# Patient Record
Sex: MALE | Race: WHITE | Employment: FULL TIME | URBAN - METROPOLITAN AREA
[De-identification: names, ages, dates, MRNs, and addresses within clinical notes are randomized per-mention and may not be internally consistent; named-entity substitution may affect disease eponyms.]

---

## 2018-05-02 PROBLEM — I45.6 WPW (WOLFF-PARKINSON-WHITE SYNDROME): Status: ACTIVE | Noted: 2018-05-02

## 2018-05-02 PROBLEM — R00.0 TACHYCARDIA: Status: ACTIVE | Noted: 2018-05-02

## 2018-05-04 ENCOUNTER — TELEPHONE (OUTPATIENT)
Dept: CARDIOLOGY CLINIC | Age: 23
End: 2018-05-04

## 2018-06-18 ENCOUNTER — OFFICE VISIT (OUTPATIENT)
Dept: CARDIOLOGY CLINIC | Age: 23
End: 2018-06-18

## 2018-06-18 VITALS
HEART RATE: 81 BPM | HEIGHT: 71 IN | SYSTOLIC BLOOD PRESSURE: 112 MMHG | BODY MASS INDEX: 20.72 KG/M2 | DIASTOLIC BLOOD PRESSURE: 70 MMHG | WEIGHT: 148 LBS

## 2018-06-18 DIAGNOSIS — I48.91 ATRIAL FIBRILLATION, UNSPECIFIED TYPE (HCC): Primary | ICD-10-CM

## 2018-06-18 DIAGNOSIS — I45.6 WPW (WOLFF-PARKINSON-WHITE SYNDROME): ICD-10-CM

## 2018-06-18 PROCEDURE — 93000 ELECTROCARDIOGRAM COMPLETE: CPT | Performed by: INTERNAL MEDICINE

## 2018-06-18 PROCEDURE — 99213 OFFICE O/P EST LOW 20 MIN: CPT | Performed by: INTERNAL MEDICINE

## 2019-03-14 ENCOUNTER — OFFICE VISIT (OUTPATIENT)
Dept: CARDIOLOGY CLINIC | Age: 24
End: 2019-03-14
Payer: COMMERCIAL

## 2019-03-14 ENCOUNTER — HOSPITAL ENCOUNTER (OUTPATIENT)
Dept: NON INVASIVE DIAGNOSTICS | Age: 24
Discharge: HOME | End: 2019-03-14
Payer: COMMERCIAL

## 2019-03-14 VITALS
HEIGHT: 70 IN | BODY MASS INDEX: 20.47 KG/M2 | WEIGHT: 143 LBS | HEART RATE: 77 BPM | SYSTOLIC BLOOD PRESSURE: 119 MMHG | DIASTOLIC BLOOD PRESSURE: 77 MMHG

## 2019-03-14 DIAGNOSIS — I48.91 ATRIAL FIBRILLATION, UNSPECIFIED TYPE (HCC): ICD-10-CM

## 2019-03-14 DIAGNOSIS — I45.6 WPW (WOLFF-PARKINSON-WHITE SYNDROME): Primary | ICD-10-CM

## 2019-03-14 LAB
LEFT VENTRICULAR EJECTION FRACTION HIGH VALUE: 60 %
LEFT VENTRICULAR EJECTION FRACTION MODE: NORMAL
LV EF: 60 %
LVEF MODALITY: NORMAL

## 2019-03-14 PROCEDURE — 93306 TTE W/DOPPLER COMPLETE: CPT

## 2019-03-14 PROCEDURE — 99213 OFFICE O/P EST LOW 20 MIN: CPT | Performed by: INTERNAL MEDICINE

## 2019-03-14 PROCEDURE — 93000 ELECTROCARDIOGRAM COMPLETE: CPT | Performed by: INTERNAL MEDICINE

## 2019-03-14 RX ORDER — ISOTRETINOIN 30 MG/1
60 CAPSULE ORAL DAILY
Status: ON HOLD | COMMUNITY
End: 2020-09-08 | Stop reason: ALTCHOICE

## 2019-03-18 ENCOUNTER — HOSPITAL ENCOUNTER (OUTPATIENT)
Dept: NON INVASIVE DIAGNOSTICS | Age: 24
Discharge: HOME | End: 2019-03-18
Payer: COMMERCIAL

## 2019-03-18 DIAGNOSIS — I45.6 WPW (WOLFF-PARKINSON-WHITE SYNDROME): ICD-10-CM

## 2019-03-18 DIAGNOSIS — I48.91 ATRIAL FIBRILLATION, UNSPECIFIED TYPE (HCC): ICD-10-CM

## 2019-03-18 PROCEDURE — 93017 CV STRESS TEST TRACING ONLY: CPT | Performed by: INTERNAL MEDICINE

## 2019-03-20 ENCOUNTER — OFFICE VISIT (OUTPATIENT)
Dept: CARDIOLOGY CLINIC | Age: 24
End: 2019-03-20
Payer: COMMERCIAL

## 2019-03-20 VITALS
BODY MASS INDEX: 20.81 KG/M2 | SYSTOLIC BLOOD PRESSURE: 127 MMHG | DIASTOLIC BLOOD PRESSURE: 71 MMHG | WEIGHT: 145.4 LBS | HEART RATE: 73 BPM | HEIGHT: 70 IN

## 2019-03-20 DIAGNOSIS — I48.91 ATRIAL FIBRILLATION, UNSPECIFIED TYPE (HCC): ICD-10-CM

## 2019-03-20 DIAGNOSIS — R00.2 PALPITATIONS: ICD-10-CM

## 2019-03-20 DIAGNOSIS — I45.6 WPW (WOLFF-PARKINSON-WHITE SYNDROME): ICD-10-CM

## 2019-03-20 DIAGNOSIS — R00.0 TACHYCARDIA: Primary | ICD-10-CM

## 2019-03-20 PROCEDURE — 99213 OFFICE O/P EST LOW 20 MIN: CPT | Performed by: INTERNAL MEDICINE

## 2019-03-21 ENCOUNTER — TELEPHONE (OUTPATIENT)
Dept: CARDIOLOGY CLINIC | Age: 24
End: 2019-03-21

## 2019-03-26 ENCOUNTER — OFFICE VISIT (OUTPATIENT)
Dept: CARDIOLOGY CLINIC | Age: 24
End: 2019-03-26
Payer: COMMERCIAL

## 2019-03-26 VITALS
HEART RATE: 91 BPM | SYSTOLIC BLOOD PRESSURE: 124 MMHG | DIASTOLIC BLOOD PRESSURE: 70 MMHG | WEIGHT: 148 LBS | OXYGEN SATURATION: 99 % | HEIGHT: 70 IN | BODY MASS INDEX: 21.19 KG/M2

## 2019-03-26 DIAGNOSIS — R00.0 TACHYCARDIA: Primary | ICD-10-CM

## 2019-03-26 DIAGNOSIS — I48.0 PAROXYSMAL ATRIAL FIBRILLATION (HCC): ICD-10-CM

## 2019-03-26 DIAGNOSIS — I45.6 WPW (WOLFF-PARKINSON-WHITE SYNDROME): ICD-10-CM

## 2019-03-26 DIAGNOSIS — R00.2 PALPITATIONS: ICD-10-CM

## 2019-03-26 PROCEDURE — 99213 OFFICE O/P EST LOW 20 MIN: CPT | Performed by: INTERNAL MEDICINE

## 2019-03-26 PROCEDURE — 93000 ELECTROCARDIOGRAM COMPLETE: CPT | Performed by: INTERNAL MEDICINE

## 2019-03-26 RX ORDER — FLECAINIDE ACETATE 50 MG/1
50 TABLET ORAL 2 TIMES DAILY
Qty: 60 TABLET | Refills: 3 | Status: ON HOLD | OUTPATIENT
Start: 2019-03-26 | End: 2020-09-08

## 2019-03-28 ENCOUNTER — NURSE ONLY (OUTPATIENT)
Dept: CARDIOLOGY CLINIC | Age: 24
End: 2019-03-28
Payer: COMMERCIAL

## 2019-03-28 DIAGNOSIS — R00.2 PALPITATIONS: Primary | ICD-10-CM

## 2019-03-28 PROCEDURE — 93000 ELECTROCARDIOGRAM COMPLETE: CPT | Performed by: INTERNAL MEDICINE

## 2020-09-08 ENCOUNTER — APPOINTMENT (OUTPATIENT)
Dept: GENERAL RADIOLOGY | Age: 25
DRG: 310 | End: 2020-09-08
Payer: COMMERCIAL

## 2020-09-08 ENCOUNTER — HOSPITAL ENCOUNTER (INPATIENT)
Age: 25
LOS: 1 days | Discharge: HOME | DRG: 310 | End: 2020-09-09
Attending: EMERGENCY MEDICINE | Admitting: INTERNAL MEDICINE
Payer: COMMERCIAL

## 2020-09-08 ENCOUNTER — TELEPHONE (OUTPATIENT)
Dept: CARDIOLOGY CLINIC | Age: 25
End: 2020-09-08

## 2020-09-08 PROBLEM — I45.6 WPW SYNDROME: Status: ACTIVE | Noted: 2020-09-08

## 2020-09-08 LAB
ANION GAP SERPL CALCULATED.3IONS-SCNC: 12 MMOL/L (ref 3–16)
BASOPHILS ABSOLUTE: 0.1 K/UL (ref 0–0.2)
BASOPHILS RELATIVE PERCENT: 0.5 %
BUN BLDV-MCNC: 13 MG/DL (ref 7–20)
CALCIUM SERPL-MCNC: 10.2 MG/DL (ref 8.3–10.6)
CHLORIDE BLD-SCNC: 102 MMOL/L (ref 99–110)
CO2: 25 MMOL/L (ref 21–32)
CREAT SERPL-MCNC: 0.8 MG/DL (ref 0.9–1.3)
EOSINOPHILS ABSOLUTE: 0.2 K/UL (ref 0–0.6)
EOSINOPHILS RELATIVE PERCENT: 2.2 %
GFR AFRICAN AMERICAN: >60
GFR NON-AFRICAN AMERICAN: >60
GLUCOSE BLD-MCNC: 107 MG/DL (ref 70–99)
HCT VFR BLD CALC: 47.3 % (ref 40.5–52.5)
HEMOGLOBIN: 16.9 G/DL (ref 13.5–17.5)
LYMPHOCYTES ABSOLUTE: 2.5 K/UL (ref 1–5.1)
LYMPHOCYTES RELATIVE PERCENT: 22.3 %
MCH RBC QN AUTO: 32.5 PG (ref 26–34)
MCHC RBC AUTO-ENTMCNC: 35.8 G/DL (ref 31–36)
MCV RBC AUTO: 90.6 FL (ref 80–100)
MONOCYTES ABSOLUTE: 0.6 K/UL (ref 0–1.3)
MONOCYTES RELATIVE PERCENT: 5.2 %
NEUTROPHILS ABSOLUTE: 7.9 K/UL (ref 1.7–7.7)
NEUTROPHILS RELATIVE PERCENT: 69.8 %
PDW BLD-RTO: 12.8 % (ref 12.4–15.4)
PLATELET # BLD: 227 K/UL (ref 135–450)
PMV BLD AUTO: 9.7 FL (ref 5–10.5)
POTASSIUM REFLEX MAGNESIUM: 4.5 MMOL/L (ref 3.5–5.1)
PRO-BNP: 54 PG/ML (ref 0–124)
RBC # BLD: 5.22 M/UL (ref 4.2–5.9)
SODIUM BLD-SCNC: 139 MMOL/L (ref 136–145)
TROPONIN: <0.01 NG/ML
WBC # BLD: 11.3 K/UL (ref 4–11)

## 2020-09-08 PROCEDURE — 99223 1ST HOSP IP/OBS HIGH 75: CPT | Performed by: INTERNAL MEDICINE

## 2020-09-08 PROCEDURE — G0378 HOSPITAL OBSERVATION PER HR: HCPCS

## 2020-09-08 PROCEDURE — 93005 ELECTROCARDIOGRAM TRACING: CPT | Performed by: NURSE PRACTITIONER

## 2020-09-08 PROCEDURE — 2580000003 HC RX 258: Performed by: NURSE PRACTITIONER

## 2020-09-08 PROCEDURE — 85025 COMPLETE CBC W/AUTO DIFF WBC: CPT

## 2020-09-08 PROCEDURE — 84443 ASSAY THYROID STIM HORMONE: CPT

## 2020-09-08 PROCEDURE — 71045 X-RAY EXAM CHEST 1 VIEW: CPT

## 2020-09-08 PROCEDURE — 84484 ASSAY OF TROPONIN QUANT: CPT

## 2020-09-08 PROCEDURE — 96361 HYDRATE IV INFUSION ADD-ON: CPT

## 2020-09-08 PROCEDURE — 99285 EMERGENCY DEPT VISIT HI MDM: CPT

## 2020-09-08 PROCEDURE — 93005 ELECTROCARDIOGRAM TRACING: CPT | Performed by: EMERGENCY MEDICINE

## 2020-09-08 PROCEDURE — 1200000000 HC SEMI PRIVATE

## 2020-09-08 PROCEDURE — 96374 THER/PROPH/DIAG INJ IV PUSH: CPT

## 2020-09-08 PROCEDURE — 80048 BASIC METABOLIC PNL TOTAL CA: CPT

## 2020-09-08 PROCEDURE — 83880 ASSAY OF NATRIURETIC PEPTIDE: CPT

## 2020-09-08 PROCEDURE — 2580000003 HC RX 258: Performed by: INTERNAL MEDICINE

## 2020-09-08 PROCEDURE — 36415 COLL VENOUS BLD VENIPUNCTURE: CPT

## 2020-09-08 RX ORDER — ACETAMINOPHEN 650 MG/1
650 SUPPOSITORY RECTAL EVERY 6 HOURS PRN
Status: DISCONTINUED | OUTPATIENT
Start: 2020-09-08 | End: 2020-09-09 | Stop reason: HOSPADM

## 2020-09-08 RX ORDER — MIDAZOLAM HYDROCHLORIDE 1 MG/ML
2 INJECTION INTRAMUSCULAR; INTRAVENOUS ONCE
Status: DISCONTINUED | OUTPATIENT
Start: 2020-09-08 | End: 2020-09-08

## 2020-09-08 RX ORDER — PROCAINAMIDE HYDROCHLORIDE 500 MG/ML
15 INJECTION INTRAMUSCULAR; INTRAVENOUS ONCE
Status: COMPLETED | OUTPATIENT
Start: 2020-09-08 | End: 2020-09-08

## 2020-09-08 RX ORDER — FLECAINIDE ACETATE 100 MG/1
50 TABLET ORAL 2 TIMES DAILY
Status: DISCONTINUED | OUTPATIENT
Start: 2020-09-08 | End: 2020-09-08

## 2020-09-08 RX ORDER — SODIUM CHLORIDE 0.9 % (FLUSH) 0.9 %
10 SYRINGE (ML) INJECTION PRN
Status: DISCONTINUED | OUTPATIENT
Start: 2020-09-08 | End: 2020-09-09 | Stop reason: HOSPADM

## 2020-09-08 RX ORDER — POLYETHYLENE GLYCOL 3350 17 G/17G
17 POWDER, FOR SOLUTION ORAL DAILY PRN
Status: DISCONTINUED | OUTPATIENT
Start: 2020-09-08 | End: 2020-09-09 | Stop reason: HOSPADM

## 2020-09-08 RX ORDER — FENTANYL CITRATE 50 UG/ML
100 INJECTION, SOLUTION INTRAMUSCULAR; INTRAVENOUS ONCE
Status: DISCONTINUED | OUTPATIENT
Start: 2020-09-08 | End: 2020-09-08

## 2020-09-08 RX ORDER — SODIUM CHLORIDE 0.9 % (FLUSH) 0.9 %
10 SYRINGE (ML) INJECTION EVERY 12 HOURS SCHEDULED
Status: DISCONTINUED | OUTPATIENT
Start: 2020-09-08 | End: 2020-09-09 | Stop reason: HOSPADM

## 2020-09-08 RX ORDER — 0.9 % SODIUM CHLORIDE 0.9 %
1000 INTRAVENOUS SOLUTION INTRAVENOUS ONCE
Status: COMPLETED | OUTPATIENT
Start: 2020-09-08 | End: 2020-09-08

## 2020-09-08 RX ORDER — ONDANSETRON 2 MG/ML
4 INJECTION INTRAMUSCULAR; INTRAVENOUS EVERY 6 HOURS PRN
Status: DISCONTINUED | OUTPATIENT
Start: 2020-09-08 | End: 2020-09-09 | Stop reason: HOSPADM

## 2020-09-08 RX ORDER — ACETAMINOPHEN 325 MG/1
650 TABLET ORAL EVERY 6 HOURS PRN
Status: DISCONTINUED | OUTPATIENT
Start: 2020-09-08 | End: 2020-09-09 | Stop reason: HOSPADM

## 2020-09-08 RX ORDER — PROMETHAZINE HYDROCHLORIDE 25 MG/1
12.5 TABLET ORAL EVERY 6 HOURS PRN
Status: DISCONTINUED | OUTPATIENT
Start: 2020-09-08 | End: 2020-09-09 | Stop reason: HOSPADM

## 2020-09-08 RX ORDER — SODIUM CHLORIDE 9 MG/ML
INJECTION, SOLUTION INTRAVENOUS CONTINUOUS
Status: DISCONTINUED | OUTPATIENT
Start: 2020-09-08 | End: 2020-09-09 | Stop reason: HOSPADM

## 2020-09-08 RX ADMIN — SODIUM CHLORIDE 1000 ML: 9 INJECTION, SOLUTION INTRAVENOUS at 15:14

## 2020-09-08 RX ADMIN — Medication 10 ML: at 20:28

## 2020-09-08 RX ADMIN — PROCAINAMIDE HYDROCHLORIDE 950 MG: 500 INJECTION INTRAMUSCULAR; INTRAVENOUS at 15:14

## 2020-09-08 RX ADMIN — SODIUM CHLORIDE: 9 INJECTION, SOLUTION INTRAVENOUS at 18:00

## 2020-09-08 ASSESSMENT — ENCOUNTER SYMPTOMS
VOMITING: 0
DIARRHEA: 0
CONSTIPATION: 0
BLOOD IN STOOL: 0
SORE THROAT: 0
ABDOMINAL PAIN: 0
SHORTNESS OF BREATH: 0
NAUSEA: 0
RHINORRHEA: 0

## 2020-09-08 ASSESSMENT — PAIN SCALES - GENERAL
PAINLEVEL_OUTOF10: 0

## 2020-09-08 NOTE — ED NOTES
Report given to 3A RN. Pt alert and oriented and shows no signs of distress at time of transfer to [facility]. Pt taken to room by 3A RN in wheelchair.        Korina Saravia RN  09/08/20 4688

## 2020-09-08 NOTE — H&P
Hospital Medicine History & Physical      PCP: [facility] (Inactive)    Date of Admission: 9/8/2020    Date of Service: Pt seen/examined on 9/8/2020  3:04 PM and   Admitted to Inpatient with expected LOS greater than two midnights due to medical therapy. Chief Complaint: Palpitations    History Of Present Illness:    26 y.o. male with PMHx significant for WPW syndrome status post ablation in 2018 admitted to the hospital with palpitations and lightheadedness  Half an hour prior to admission around 2 PM patient was sitting in his car suddenly had palpitations which are very regular felt very lightheaded with that  Denied any chest pain  No shortness of breath  No loss of consciousness  No abdominal pain  No fevers  No recent change in lifestyle  In the emergency department noted to be in heart rate of 200s  Having discussed with cardiology was given procainamide  When I saw him his heart rate was down 208    Past Medical History:          Diagnosis Date    Pneumonia        Past Surgical History:          Procedure Laterality Date    TYMPANOSTOMY TUBE PLACEMENT         Medications Prior to Admission:      Prior to Admission medications    Medication Sig Start Date End Date Taking? Authorizing Provider   flecainide (TAMBOCOR) 50 MG tablet Take 1 tablet by mouth 2 times daily 3/26/19   Naomi Costa MD   ISOtretinoin (ACCUTANE) 30 MG chemo capsule Take 60 mg by mouth daily    Historical Provider, MD       Allergies:  Patient has no known allergies. Social History:      The patient currently lives at home     TOBACCO:   reports that he has never smoked. He has never used smokeless tobacco.  ETOH:   reports no history of alcohol use. Family History:      Reviewed in detail and negative for DM, CAD, Cancer, CVA.  Positive as follows:        Problem Relation Age of Onset    Diabetes Father     Heart Disease Neg Hx     Hypertension Neg Hx     High Cholesterol Neg Hx        REVIEW OF SYSTEMS:   Pertinent positives as noted in the HPI. All other systems reviewed and negative. PHYSICAL EXAM:    /72   Pulse 128   Temp 98.1 °F (36.7 °C) (Oral)   Resp 17   Ht 5' 11\" (1.803 m)   Wt 140 lb (63.5 kg)   SpO2 100%   BMI 19.53 kg/m²     General appearance:  No apparent distress, appears stated age and cooperative. HEENT:  Normal cephalic, atraumatic without obvious deformity. Pupils equal, round, and reactive to light. Extra ocular muscles intact. Conjunctivae/corneas clear. Neck: Supple, with full range of motion. No jugular venous distention. Trachea midline. Respiratory:  Normal respiratory effort. Clear to auscultation, bilaterally without RALES/WHEEZES/RHONCHI. Cardiovascular:  Regular rate and rhythm with normal S1/S2 without MURMUR, rubs or gallops. Abdomen: Soft, non-tender, non-distended with normal bowel sounds. Musculoskeletal:  No clubbing, cyanosis or EDEMA bilaterally. Full range of motion without deformity. Skin: Skin color, texture, turgor normal.  No rashes or lesions. Neurologic:  Neurovascularly intact without any focal sensory/motor deficits. Cranial nerves: II-XII intact, grossly non-focal.  Psychiatric:  Alert and oriented, thought content appropriate, normal insight  Capillary Refill: Brisk,< 3 seconds   Peripheral Pulses: +2 palpable, equal bilaterally       Labs:     Recent Labs     09/08/20  1518   WBC 11.3*   HGB 16.9   HCT 47.3        Recent Labs     09/08/20  1518      K 4.5      CO2 25   BUN 13   CREATININE 0.8*   CALCIUM 10.2     No results for input(s): AST, ALT, BILIDIR, BILITOT, ALKPHOS in the last 72 hours. No results for input(s): INR in the last 72 hours.   Recent Labs     09/08/20  1518   TROPONINI <0.01       Urinalysis:    No results found for: Edouard Breanne, BACTERIA, RBCUA, BLOODU, [address], [address]    Radiology:     CXR: I have reviewed the CXR with the following interpretation: nad  EKG:  I have reviewed the EKG with

## 2020-09-08 NOTE — LETTER
[facility] Emergency Department      [address] [place] [address], [address]            PROOF OF PRESENCE      To Whom It May Concern:    Tory Baum was present in the Emergency Department at [facility] on 09/08/2020.                                      Sincerely,        TAYLOR CAI

## 2020-09-08 NOTE — ED PROVIDER NOTES
[facility]        Pt Name: Yasmani Sheppard  MRN: [id number]  [place] 1995  Date of evaluation: 9/8/2020  Provider: MAHAD Parsons CNP  PCP: [facility] (Inactive)    This patient was seen and evaluated by the attending physician Joselito Mills, [address] [place] [address]       Chief Complaint   Patient presents with    Chest Pain     c/o chest pain,sob, and palpitations, pt had episode yesterday for about 30 seconds, and restarted today        HISTORY OF PRESENT ILLNESS   (Location/Symptom, Timing/Onset,Context/Setting, Quality, Duration, Modifying Factors, Severity)  Note limiting factors. Yasmani Sheppard is a 26 y.o. male who presents emergency department with concern for racing heart beat. Symptoms started about 30 minutes prior to arrival.  He is visibly anxious on arrival.  Reports history of WPW. He thinks this is the same. He reports he is required cardioversion in the past.  Dr. Dawn Park is his cardiologist.  He denies fever, rash, headaches, dizziness, chest pain, shortness of breath, cough, congestion, abdominal pain, nausea, vomiting, diarrhea, constipation, blood in the stool, or painful urination. One friend/family member at bedside. Nursing Notes triage note reviewed and agreed with or any disagreements were addressed  in the HPI. REVIEW OF SYSTEMS    (2-9 systems for level 4, 10 or more for level 5)     Review of Systems   Constitutional: Negative for chills and fever. HENT: Negative for postnasal drip, rhinorrhea and sore throat. Eyes: Negative for visual disturbance. Respiratory: Negative for shortness of breath. Cardiovascular: Positive for palpitations. Negative for chest pain. Gastrointestinal: Negative for abdominal pain, blood in stool, constipation, diarrhea, nausea and vomiting. Genitourinary: Negative for dysuria, flank pain and hematuria. Skin: Negative for rash. Neurological: Negative for weakness and headaches. All other systems reviewed and are negative. Positives and Pertinent negatives as per HPI. Except as noted above in the ROS, all other systems were reviewed and negative. PAST MEDICAL HISTORY     Past Medical History:   Diagnosis Date    Pneumonia          SURGICAL HISTORY       Past Surgical History:   Procedure Laterality Date    TYMPANOSTOMY TUBE PLACEMENT           CURRENT MEDICATIONS       Previous Medications    FLECAINIDE (TAMBOCOR) 50 MG TABLET    Take 1 tablet by mouth 2 times daily    ISOTRETINOIN (ACCUTANE) 30 MG CHEMO CAPSULE    Take 60 mg by mouth daily         ALLERGIES     Patient has no known allergies.     FAMILY HISTORY       Family History   Problem Relation Age of Onset    Diabetes Father     Heart Disease Neg Hx     Hypertension Neg Hx     High Cholesterol Neg Hx           SOCIAL HISTORY       Social History     Socioeconomic History    Marital status: Single     Spouse name: None    Number of children: None    Years of education: None    Highest education level: None   Occupational History    None   Social Needs    Financial resource strain: None    Food insecurity     Worry: None     Inability: None    Transportation needs     Medical: None     Non-medical: None   Tobacco Use    Smoking status: Never Smoker    Smokeless tobacco: Never Used   Substance and Sexual Activity    Alcohol use: No    Drug use: No    Sexual activity: None   Lifestyle    Physical activity     Days per week: None     Minutes per session: None    Stress: None   Relationships    Social connections     Talks on phone: None     Gets together: None     Attends Jainism service: None     Active member of club or organization: None     Attends meetings of clubs or organizations: None     Relationship status: None    Intimate partner violence     Fear of current or ex partner: None     Emotionally abused: None     Physically abused: None components within normal limits    Narrative:     Performed at:  [facility]  [address]  [place], [address]   Phone [phone]   BASIC METABOLIC PANEL W/ REFLEX TO MG FOR LOW K - Abnormal; Notable for the following components:    Glucose 107 (*)     CREATININE 0.8 (*)     All other components within normal limits    Narrative:     Performed at:  [facility]  [address],  [place], [address]   Phone [phone]   TROPONIN    Narrative:     Performed at:  [facility] [address] [place],  [place], [address]   Phone [phone]     Narrative:     Performed at:  [facility] [address]  [place], [address]   Phone [phone]       All other labs werewithin normal range or not returned as of this dictation. EKG: All EKG's are interpreted by the Emergency Department Physician who either signs or Co-signs this chart in the absence of acardiologist.  Please see their note for interpretation of EKG. RADIOLOGY:   Interpretation per the Radiologist below, if available at the time of this note:    XR CHEST PORTABLE   Final Result   No acute cardiopulmonary disease. No results found. PROCEDURES   Unless otherwise noted below, none     Procedures    CRITICAL CARE TIME     There was a high probability of life-threatening clinical deterioration in the patient's condition requiring my urgent intervention. The total critical care time spent while evaluating and treating this patient was at least 32 minutes. This excludes time spent doing separately billable procedures. This includes time at the bedside, data interpretation, medication management, obtaining critical history from collateral sources if the patient is unable to provide it directly, and physician consultation.  Specifics of interventions taken and potentially life-threatening diagnostic considerations are listed in the medical decision making. CONSULTS:  IP CONSULT TO CARDIOLOGY  IP CONSULT TO HOSPITALIST      EMERGENCY DEPARTMENT COURSE and DIFFERENTIAL DIAGNOSIS/MDM:   Vitals:    Vitals:    09/08/20 1455 09/08/20 1501   BP: 119/72    Pulse: 128    Resp: 17    Temp:  98.1 °F (36.7 °C)   TempSrc:  Oral   SpO2: 100%    Weight: 140 lb (63.5 kg)    Height: 5' 11\" (1.803 m)        Shannon Cueto was given the following medications:  Medications   0.9 % sodium chloride bolus (1,000 mLs Intravenous New Bag 9/8/20 1514)   procainamide (PRONESTYL) injection 950 mg (950 mg Intravenous Given 9/8/20 1514)       Shannon Cueto was evaluated in the emergency department with concern for tachycardia. Identified to have an abnormal EKG with heart rate up to 250 bpm.  Known Yordan-Parkinson-White syndrome. For this reason he was given IV fluids and procainamide. His heart rate did improve, however he still remains tachycardic with heart rate 109-130. We spoke with Dr. Leana Harrison from cardiology. He recommends inpatient management overnight. The hospitalist was consulted for admission. This plan was discussed with the patient who is in agreement with the plan. FINAL IMPRESSION      1. Tachycardia    2. Yordan-Parkinson-White (WPW) syndrome    3.  Abnormal EKG          DISPOSITION/PLAN   DISPOSITION Decision To Admit 09/08/2020 04:18:41 PM      (Please note that portions of this note were completed with a voice recognition program.  Efforts were made to edit the dictations but occasionally words are mis-transcribed.)    MAHAD Vargas CNP (electronically signed)        MAHAD Vargas CNP  09/08/20 5445

## 2020-09-08 NOTE — ED NOTES
Bed: 02  Expected date:   Expected time:   Means of arrival:   Comments:  Rapid hr     Arsen Ferreira RN  09/08/20 9113

## 2020-09-09 VITALS
HEIGHT: 71 IN | HEART RATE: 75 BPM | DIASTOLIC BLOOD PRESSURE: 57 MMHG | SYSTOLIC BLOOD PRESSURE: 112 MMHG | OXYGEN SATURATION: 100 % | BODY MASS INDEX: 22.04 KG/M2 | RESPIRATION RATE: 18 BRPM | WEIGHT: 157.4 LBS | TEMPERATURE: 98.2 F

## 2020-09-09 LAB
A/G RATIO: 1.8 (ref 1.1–2.2)
ALBUMIN SERPL-MCNC: 4.2 G/DL (ref 3.4–5)
ALP BLD-CCNC: 69 U/L (ref 40–129)
ALT SERPL-CCNC: 47 U/L (ref 10–40)
ANION GAP SERPL CALCULATED.3IONS-SCNC: 8 MMOL/L (ref 3–16)
AST SERPL-CCNC: 26 U/L (ref 15–37)
BASOPHILS ABSOLUTE: 0 K/UL (ref 0–0.2)
BASOPHILS RELATIVE PERCENT: 0.3 %
BILIRUB SERPL-MCNC: 0.9 MG/DL (ref 0–1)
BUN BLDV-MCNC: 13 MG/DL (ref 7–20)
CALCIUM SERPL-MCNC: 9.2 MG/DL (ref 8.3–10.6)
CHLORIDE BLD-SCNC: 106 MMOL/L (ref 99–110)
CO2: 26 MMOL/L (ref 21–32)
CREAT SERPL-MCNC: 0.7 MG/DL (ref 0.9–1.3)
EKG ATRIAL RATE: 122 BPM
EKG ATRIAL RATE: 125 BPM
EKG DIAGNOSIS: NORMAL
EKG DIAGNOSIS: NORMAL
EKG P AXIS: 45 DEGREES
EKG P-R INTERVAL: 114 MS
EKG Q-T INTERVAL: 306 MS
EKG Q-T INTERVAL: 356 MS
EKG QRS DURATION: 110 MS
EKG QRS DURATION: 92 MS
EKG QTC CALCULATION (BAZETT): 455 MS
EKG QTC CALCULATION (BAZETT): 507 MS
EKG R AXIS: 53 DEGREES
EKG R AXIS: 68 DEGREES
EKG T AXIS: 49 DEGREES
EKG T AXIS: 56 DEGREES
EKG VENTRICULAR RATE: 122 BPM
EKG VENTRICULAR RATE: 133 BPM
EOSINOPHILS ABSOLUTE: 0.2 K/UL (ref 0–0.6)
EOSINOPHILS RELATIVE PERCENT: 2.4 %
GFR AFRICAN AMERICAN: >60
GFR NON-AFRICAN AMERICAN: >60
GLOBULIN: 2.3 G/DL
GLUCOSE BLD-MCNC: 101 MG/DL (ref 70–99)
HCT VFR BLD CALC: 37.8 % (ref 40.5–52.5)
HEMOGLOBIN: 13.3 G/DL (ref 13.5–17.5)
INR BLD: 1.04 (ref 0.86–1.14)
LYMPHOCYTES ABSOLUTE: 1.9 K/UL (ref 1–5.1)
LYMPHOCYTES RELATIVE PERCENT: 18.8 %
MCH RBC QN AUTO: 31.5 PG (ref 26–34)
MCHC RBC AUTO-ENTMCNC: 35.2 G/DL (ref 31–36)
MCV RBC AUTO: 89.6 FL (ref 80–100)
MONOCYTES ABSOLUTE: 0.8 K/UL (ref 0–1.3)
MONOCYTES RELATIVE PERCENT: 7.7 %
NEUTROPHILS ABSOLUTE: 7.1 K/UL (ref 1.7–7.7)
NEUTROPHILS RELATIVE PERCENT: 70.8 %
PDW BLD-RTO: 12.6 % (ref 12.4–15.4)
PLATELET # BLD: 190 K/UL (ref 135–450)
PMV BLD AUTO: 9.9 FL (ref 5–10.5)
POTASSIUM REFLEX MAGNESIUM: 4.1 MMOL/L (ref 3.5–5.1)
PROTHROMBIN TIME: 12.1 SEC (ref 10–13.2)
RBC # BLD: 4.22 M/UL (ref 4.2–5.9)
SODIUM BLD-SCNC: 140 MMOL/L (ref 136–145)
TOTAL PROTEIN: 6.5 G/DL (ref 6.4–8.2)
TSH REFLEX: 0.61 UIU/ML (ref 0.27–4.2)
WBC # BLD: 10.1 K/UL (ref 4–11)

## 2020-09-09 PROCEDURE — 93010 ELECTROCARDIOGRAM REPORT: CPT | Performed by: INTERNAL MEDICINE

## 2020-09-09 PROCEDURE — 2580000003 HC RX 258: Performed by: INTERNAL MEDICINE

## 2020-09-09 PROCEDURE — 85610 PROTHROMBIN TIME: CPT

## 2020-09-09 PROCEDURE — G0378 HOSPITAL OBSERVATION PER HR: HCPCS

## 2020-09-09 PROCEDURE — 80053 COMPREHEN METABOLIC PANEL: CPT

## 2020-09-09 PROCEDURE — 96361 HYDRATE IV INFUSION ADD-ON: CPT

## 2020-09-09 PROCEDURE — 36415 COLL VENOUS BLD VENIPUNCTURE: CPT

## 2020-09-09 PROCEDURE — 85025 COMPLETE CBC W/AUTO DIFF WBC: CPT

## 2020-09-09 RX ORDER — DILTIAZEM HYDROCHLORIDE 60 MG/1
60 TABLET, FILM COATED ORAL 4 TIMES DAILY PRN
Qty: 30 TABLET | Refills: 1 | Status: SHIPPED | OUTPATIENT
Start: 2020-09-09 | End: 2021-01-05 | Stop reason: ALTCHOICE

## 2020-09-09 RX ADMIN — SODIUM CHLORIDE: 9 INJECTION, SOLUTION INTRAVENOUS at 04:10

## 2020-09-09 RX ADMIN — Medication 10 ML: at 07:46

## 2020-09-09 ASSESSMENT — PAIN SCALES - GENERAL
PAINLEVEL_OUTOF10: 0

## 2020-09-09 NOTE — PLAN OF CARE
Problem: Falls - Risk of:  Goal: Will remain free from falls  Description: Will remain free from falls  9/9/2020 1412 by Amadou Angel RN  Outcome: Completed  9/9/2020 0209 by Jane Alexander RN  Outcome: Met This Shift  Note: Pt is wearing yellow nonskid socks. Bed is in lowest position, locked, side rails up 2/4, and call light is within reach. Pt informed of fall risks, verbalizes understanding. Will monitor. Goal: Absence of physical injury  Description: Absence of physical injury  9/9/2020 1412 by Amadou Angel RN  Outcome: Completed  9/9/2020 0209 by Jane Alexander RN  Outcome: Met This Shift     Problem: Cardiac:  Goal: Ability to maintain vital signs within normal range will improve  Description: Ability to maintain vital signs within normal range will improve  9/9/2020 1412 by Amadou Angel RN  Outcome: Completed  9/9/2020 0209 by Jane Alexander RN  Outcome: Met This Shift  Note: HR now WNL.   Goal: Cardiovascular alteration will improve  Description: Cardiovascular alteration will improve  9/9/2020 1412 by Amadou Angel RN  Outcome: Completed  9/9/2020 0209 by Jane Alexander RN  Outcome: Met This Shift     Problem: Physical Regulation:  Goal: Complications related to the disease process, condition or treatment will be avoided or minimized  Description: Complications related to the disease process, condition or treatment will be avoided or minimized  9/9/2020 1412 by Amadou Angel RN  Outcome: Completed  9/9/2020 0209 by Jane Alexander RN  Outcome: Met This Shift

## 2020-09-09 NOTE — PROGRESS NOTES
[facility]   Electrophysiology Progress Note     Date: 9/9/2020  Admit Date: 9/8/2020     Reason for consultation: WPW    Chief Complaint:   Chief Complaint   Patient presents with    Chest Pain     c/o chest pain,sob, and palpitations, pt had episode yesterday for about 30 seconds, and restarted today        History of Present Illness: History obtained from patient and medical record. Unique Santos is a 26 y.o. male with a past medical history of WPW and atrial fibrillation. He has followed with Dr. Dagoberto Ramon from our office in the past. In May of 2018, he presented to the ER with palpitations. He was found to be in a WCT and underwent urgent DCCV. He underwent EP study cryo-ablation for parahisian pathway (5/3/18, Dr. Dagoberto Ramon). His delta wave returned post op, but disappeared during stress testing at 80 BPM.     Pt presented to ER with chest pain, SOB, and palpitation. He was found to be tachycardic and was given IV procainamide with resolution to sinus rhythm. EP consulted for rhythm management. Interval Hx: Today, he is being seen for WPW. He is in sinus rhythm on telemetry. He feels better. Patient seen and examined. Clinical notes reviewed. Telemetry reviewed. No new complaints today. No major events overnight. Denies having chest pain, palpitations, shortness of breath, orthopnea/PND, cough, or dizziness at the time of this visit.     Allergies:  No Known Allergies    Home Meds:  Prior to Visit Medications    Not on File      Scheduled Meds:   sodium chloride flush  10 mL Intravenous 2 times per day    enoxaparin  40 mg Subcutaneous Nightly     Continuous Infusions:   sodium chloride 100 mL/hr at 09/09/20 0410     PRN Meds:sodium chloride flush, acetaminophen **OR** acetaminophen, polyethylene glycol, promethazine **OR** ondansetron     Past Medical History:  Past Medical History:   Diagnosis Date    Pneumonia       Past Surgical History:    has a past surgical history that includes Tympanostomy tube placement. Social History:  Reviewed. reports that he has never smoked. He has never used smokeless tobacco. He reports that he does not drink alcohol or use drugs. Family History:  Reviewed. family history includes Diabetes in his father. Review of Systems:  · Constitutional: Negative for fever, night sweats, chills, weight changes, or weakness  · Skin: Negative for rash, dry skin, pruritus, bruising, bleeding, blood clots, or changes in skin pigment  · HEENT: Negative for vision changes, ringing in the ears, sore throat, dysphagia, or swollen lymph nodes  · Respiratory: Reviewed in HPI  · Cardiovascular: Reviewed in HPI  · Gastrointestinal: Negative for abdominal pain, N/V/D, constipation, or black/tarry stools  · Genito-Urinary: Negative for dysuria, incontinence, urgency, or hematuria  · Musculoskeletal: Negative for joint swelling, muscle pain, or injuries  · Neurological/Psych: Negative for confusion, seizures, headaches, balance issues or TIA-like symptoms. No anxiety, depression, or insomnia    Physical Examination:  Vitals:    09/09/20 0746   BP: (!) 112/57   Pulse: 75   Resp: 18   Temp: 98.2 °F (36.8 °C)   SpO2: 100%      In: 490 [P.O.:480; I.V.:10]  Out: -    Wt Readings from Last 3 Encounters:   09/09/20 157 lb 6.4 oz (71.4 kg)   03/26/19 148 lb (67.1 kg)   03/20/19 145 lb 6.4 oz (66 kg)       Intake/Output Summary (Last 24 hours) at 9/9/2020 0948  Last data filed at 9/8/2020 2028  Gross per 24 hour   Intake 490 ml   Output --   Net 490 ml     Telemetry: Personally Reviewed  - Sinus rhythm   · Constitutional: Cooperative and in no apparent distress, and appears well nourished  · Skin: Warm and pink; no pallor, cyanosis, bruising, or clubbing  · HEENT: Symmetric and normocephalic. PERRL, EOM intact. Conjunctiva pink with clear sclera. Mucus membranes pink and moist. Teeth intact. Thyroid smooth without nodules or goiter. · Cardiovascular: Regular rate and rhythm. S1/S2 present without murmurs, rubs, or gallops. Peripheral pulses 2+, capillary refill < 3 seconds. No elevation of JVP. No peripheral edema  · Respiratory: Respirations symmetric and unlabored. Lungs clear to auscultation bilaterally, no wheezing, crackles, or rhonchi  · Gastrointestinal: Abdomen soft and round. Bowel sounds normoactive in all quadrants without tenderness or masses. · Musculoskeletal: Bilateral upper and lower extremity strength 5/5 with full ROM  · Neurologic/Psych: Awake and orientated to person, place and time. Calm affect, appropriate mood    Pertinent labs, diagnostic, device, and imaging results reviewed as a part of this visit    Labs:    BMP:   Recent Labs     20  1518 20  0548    140   K 4.5 4.1    106   CO2 25 26   BUN 13 13   CREATININE 0.8* 0.7*     Estimated Creatinine Clearance: 164 mL/min (A) (based on SCr of 0.7 mg/dL (L)). CBC:   Recent Labs     20  1518 20  0548   WBC 11.3* 10.1   HGB 16.9 13.3*   HCT 47.3 37.8*   MCV 90.6 89.6    190     Thyroid: No results found for: TSH, E6COVBG, Q1YQGRU, THYROIDAB  Lipids: No results found for: CHOL, HDL, TRIG  LFTS:   Lab Results   Component Value Date    ALT 47 2020    AST 26 2020    ALKPHOS 69 2020    PROT 6.5 2020    AGRATIO 1.8 2020    BILITOT 0.9 2020     Cardiac Enzymes:   Lab Results   Component Value Date    TROPONINI <0.01 2020    TROPONINI <0.01 2018     Coags:   Lab Results   Component Value Date    PROTIME 12.1 2020    INR 1.04 2020       EC20  Atrial fibrillation with RVR    ECHO: 3/19  Left ventricle - normal size, thickness and function with EF of 60%  Tricuspid valve - trivial regurgitation    Stress Test: 3/19   Normal stress test   Baseline delta wave/WPW pattern.    Delta wave disappears at HR over 80 BPM    Problem List:   Patient Active Problem List    Diagnosis Date Noted    WPW syndrome 2020    Tachycardia 05/02/2018    WPW (Yordan-Parkinson-White syndrome) 05/02/2018    Atrial fibrillation (HCC)     Palpitations         Assessment and Plan:     1. Paroxysmal Atrial Fibrillation  - Currently in NSR  - Consider Class IC medications for long term control  - [id number] score: 0 ; [id number] Vasc score and anticoagulation discussed.   ~ No need for OAC    - Afib risk factors including age, HTN, obesity, inactivity and XENIA were discussed with patient. Risk factor modification recommended   ~ TSH 0.61 (9/8/20)       - Treatment options including cardioversion, rate control strategy, antiarrhythmics, anticoagulation and possible ablation were discussed with patient. Risks, benefits and alternative of each treatment options were explained. All questions answered    2. WPW   - Hx of cryoablation of parahisian pathway (5/18)   - Delta wave noted on EKG 3/19     All pertinent information and plan of care discussed with the EP physician. All questions and concerns were addressed to the patient. Alternatives to my treatment were discussed. I have discussed the above stated plan with patient and the nurse. The patient verbalized understanding and agreed with the plan. Thank you for allowing to us to participate in the care of Vineet Ponce.     MAHAD [facility]  [address]   Office: [phone]

## 2020-09-09 NOTE — PROGRESS NOTES
Received orders for discharge to home. Reviewed discharge instructions call Dr Giuliana Rich in 2 days to schedule a cardiac ablation, start taking Cardizem as needed for sustained racing HR. Information related to A fib, ablations, arrhthymias' and new medication reviewed. Pt as voiced understanding of the above. Tele and IV removed per protocol. Pt transport independently to personal vehicle with family & all belongings.

## 2020-09-09 NOTE — PLAN OF CARE
Problem: Falls - Risk of:  Goal: Will remain free from falls  Description: Will remain free from falls  9/9/2020 0209 by Ibis San RN  Outcome: Met This Shift  Note: Pt is wearing yellow nonskid socks. Bed is in lowest position, locked, side rails up 2/4, and call light is within reach. Pt informed of fall risks, verbalizes understanding. Will monitor. 9/8/2020 1745 by Gamaliel Florse RN  Outcome: Ongoing  Goal: Absence of physical injury  Description: Absence of physical injury  9/9/2020 0209 by Ibis San RN  Outcome: Met This Shift  9/8/2020 1745 by Gamaliel Flores RN  Outcome: Ongoing     Problem: Cardiac:  Goal: Ability to maintain vital signs within normal range will improve  Description: Ability to maintain vital signs within normal range will improve  Outcome: Met This Shift  Note: HR now WNL.   Goal: Cardiovascular alteration will improve  Description: Cardiovascular alteration will improve  Outcome: Met This Shift     Problem: Physical Regulation:  Goal: Complications related to the disease process, condition or treatment will be avoided or minimized  Description: Complications related to the disease process, condition or treatment will be avoided or minimized  Outcome: Met This Shift

## 2020-09-09 NOTE — DISCHARGE SUMMARY
Physician Discharge Summary     Patient ID:  Concha Rendon  [id number]  30 y.o.  1995    Admit date: 9/8/2020    Discharge date and time: No discharge date for patient encounter. 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]    Discharge Diagnoses:   Atrial fibrillation with rapid ventricular rates converted back to sinus rhythm on his own    Admission Condition: fair    Discharged Condition: good    Indication for Admission: palpitations    Hospital Course:   26year-old known history of WPW status post ablation admitted to the hospital with palpitations and lightheadedness noted to be in fast atrial fibrillation  Per cardiology procainamide was given to the patient  His heart rate improved and he converted back to sinus rhythm  He was seen by electrophysiology who plan to do a RF ablation and Robert navigation system  In the meantime he will use diltiazem for symptom control  Patient is being discharged home        Discharge Exam:  BP (!) 112/57   Pulse 75   Temp 98.2 °F (36.8 °C) (Temporal)   Resp 18   Ht 5' 11\" (1.803 m)   Wt 157 lb 6.4 oz (71.4 kg)   SpO2 100%   BMI 21.95 kg/m²   General appearance: alert, appears stated age and cooperative  Lungs: clear to auscultation bilaterally  Heart: regular rate and rhythm, S1, S2 normal, no murmur, click, rub or gallop  Abdomen: soft, non-tender; bowel sounds normal; no masses,  no organomegaly    Disposition: home    In process/preliminary results:  Outstanding Order Results     No orders found for last 30 day(s).           Patient Instructions:   Current Discharge Medication List      CONTINUE these medications which have NOT CHANGED    Details   dilTIAZem (CARDIZEM) 60 MG tablet Take 1 tablet by mouth 4 times daily as needed (For episodes of palpitations and fast pulse rate)  Qty: 30 tablet, Refills: 1         STOP taking these medications flecainide (TAMBOCOR) 50 MG tablet Comments:   Reason for Stopping:             Activity: activity as tolerated  Diet: regular diet  Wound Care: none needed      Signed:  Liban Soliz MD  9/9/2020  2:10 PM

## 2020-09-09 NOTE — CARE COORDINATION
Discharge Planning Assessment  RN/SW discharge planner met with patient/ fiance to discuss reason for admission, current living situation, and potential needs at the time of discharge    Demographics/Insurance verified Yes    Current type of dwelling: Apartment    Patient from ECF/SW confirmed with:  N/A    Living arrangements: lives with fifanny    Level of function/Support: Independent    PCP: Dr Shell Dhaliwal      Last Visit to PCP: couple of years    DME: None    Active with any community resources/agencies/skilled home care: No    Medication compliance issues: denies    Financial issues that could impact healthcare: no        Tentative discharge plan: home    Discussed and provided facilities of choice if transition to a skilled nursing facility is required at the time of discharge- no    Discussed with patient and/or family that on the day of discharge home tentative time of discharge will be between 10 AM and noon.     Transportation at the time of discharge: family

## 2020-09-09 NOTE — CONSULTS
alcohol or use drugs. Family History:  Reviewed. family history includes Diabetes in his father. Review of System:  All other systems reviewed except for that noted above. Pertinent negatives and positives are:       · General: negative for fever, chills   · Ophthalmic ROS: negative for - eye pain or loss of vision  · ENT ROS: negative for - headaches, sore throat   · Respiratory: negative for - cough, sputum  · Cardiovascular: Reviewed in HPI  · Gastrointestinal: negative for - abdominal pain, diarrhea, N/V  · Hematology: negative for - bleeding, blood clots, bruising or jaundice  · Genito-Urinary:  negative for - Dysuria or incontinence  · Musculoskeletal: negative for - Joint swelling, muscle pain  · Neurological: negative for - confusion, + dizziness, headaches   · Psychiatric: No anxiety, no depression. · Dermatological: negative for - rash    Physical Examination:  Vitals:    20   BP: 113/71   Pulse: 87   Resp: 16   Temp: 98.8 °F (37.1 °C)   SpO2: 99%      In: 490 [P.O.:480; I.V.:10]  Out: -    Wt Readings from Last 3 Encounters:   20 141 lb 12.1 oz (64.3 kg)   19 148 lb (67.1 kg)   19 145 lb 6.4 oz (66 kg)     Temp  Av.3 °F (36.8 °C)  Min: 98 °F (36.7 °C)  Max: 98.8 °F (37.1 °C)  Pulse  Av.1  Min: 87  Max: 128  BP  Min: 108/95  Max: 135/68  SpO2  Av.9 %  Min: 99 %  Max: 100 %    Intake/Output Summary (Last 24 hours) at 2020  Last data filed at 2020  Gross per 24 hour   Intake 490 ml   Output --   Net 490 ml       · Telemetry: Sinus rhythm   · Constitutional: Oriented. No distress. · Head: Normocephalic and atraumatic. · Mouth/Throat: Oropharynx is clear and moist.   · Eyes: Conjunctivae normal. EOM are normal.   · Neck: Neck supple. No rigidity. No JVD present. · Cardiovascular: Normal rate, regular rhythm, S1&S2. · Pulmonary/Chest: Bilateral respiratory sounds. No wheezes, No rhonchi. · Abdominal: Soft. Bowel sounds present. No distension, No tenderness. · Musculoskeletal: No tenderness. No edema    · Lymphadenopathy: Has no cervical adenopathy. · Neurological: Alert and oriented. Cranial nerve appears intact, No Gross deficit   · Skin: Skin is warm and dry. No rash noted. · Psychiatric: Has a normal behavior     Labs, diagnostic and imaging results reviewed. Reviewed. Recent Labs     09/08/20  1518      K 4.5      CO2 25   BUN 13   CREATININE 0.8*     Recent Labs     09/08/20  1518   WBC 11.3*   HGB 16.9   HCT 47.3   MCV 90.6        Lab Results   Component Value Date    TROPONINI <0.01 09/08/2020     No results found for: BNP  Lab Results   Component Value Date    PROTIME 11.7 05/02/2018    INR 1.04 05/02/2018     No results found for: CHOL, HDL, TRIG    ECG: Atrial fibrillation with RVR     Sinus tachycardia, incomplete RBBB     Echo: 2016:   Left ventricle - normal size, thickness and function with EF of 60%     Tricuspid valve - trivial regurgitation      Scheduled Meds:   sodium chloride flush  10 mL Intravenous 2 times per day    enoxaparin  40 mg Subcutaneous Nightly     Continuous Infusions:   sodium chloride 100 mL/hr at 09/08/20 1800     PRN Meds:.sodium chloride flush, acetaminophen **OR** acetaminophen, polyethylene glycol, promethazine **OR** ondansetron     Patient Active Problem List    Diagnosis Date Noted    Tachycardia 05/02/2018     Priority: Low    WPW (Yordan-Parkinson-White syndrome) 05/02/2018     Priority: Low    Atrial fibrillation (HCC)      Priority: Low    Palpitations      Priority: Low    WPW syndrome 09/08/2020      Active Hospital Problems    Diagnosis Date Noted    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 We discussed treatment options including antiarrhythmics, rate control with anticoagulation, and ablation.      We discussed progressive nature of atrial fibrillation. Treatment success decreases when AF becomes persistent and last more than 6 months. Antiarrhythmic therapy, side effects, benefits and alternative discussed. Atrial fibrillation ablation procedure was discussed. We discussed the need for repeat procedure. On average patients may need more than one ablation procedure. Risks associated with ablation include but not limited to allergic reaction to the medications, pain, bleeding, infection, nerve injury, injury to diaphragm(breathing muscle), pulmonary embolus(blood clot in lungs), deep vein blood clot, pneumothorax, hemothorax, acute renal failure, cardiac perforation,  tamponade, need for emergent surgery (open heart), permanent pacemaker, pulmonary vein stenosis, left atrial to esophageal fistula, stroke, myocardial infarction and death. Difference between atrial fibrillation and atrial flutter discussed and treatment discussed. Patient verbalized understanding of procedure, risks and benefits and wishes to proceed with ablation. Will schedule for RF ablation and Carto navigation system. Educational information about ablation was provided and patient was encouraged to review information and call back with any questions. Discussed also antiarrhythmic therapy and medical therapy with patient. He will use as needed Cardizem if he has tachycardia. - WPW syndrome:    Patient has history of WPW syndrome and ablation. Although it appears that his accessory pathway conduction had recurred after ablation but conduction was intermittent and with the rate more than 80 bpm preexcitation was no longer present on his GXT. Also on arrival his ECG was narrow complex with no preexcitation. I do not see any preexcitation on his rest ECG too.   Since he opted for EP study and ablation will check for preexcitation and accessory pathway conduction during A. fib ablation.    - The patient is counseled to avoid excess caffeine, and energy drinks as this may exacerbated ectopy and arrhythmia. - The patient is counseled to get regular exercise 3-5 times per week to control cardiovascular risk factors. Thank you for allowing me to participate in the care of Devendra Anderson     I independently reviewed his ECG, chest x-ray and prior outside records. All questions and concerns were addressed to the patient/family. Alternatives to my treatment were discussed. I have discussed the above stated plan and the patient verbalized understanding and agreed with the plan. NOTE: This report was transcribed using voice recognition software. Every effort was made to ensure accuracy, however, inadvertent computerized transcription errors may be present.      Hank Hughes MD, MPH  [place] [address]   Office: [phone]

## 2020-09-09 NOTE — PROGRESS NOTES
Assessment and med pass complete, pt refused SQ Lovenox, this RN explained risks associated with refusal, pt continued to refuse. Provided with fresh ice water and apple juice. Denies needs, call light in reach.

## 2020-09-10 ENCOUNTER — TELEPHONE (OUTPATIENT)
Dept: CARDIOLOGY CLINIC | Age: 25
End: 2020-09-10

## 2020-09-10 NOTE — TELEPHONE ENCOUNTER
Pt was released from [facility] yesterday and today his HR is going up & down ranging from . He was driving to his parent's home and became lightheaded. They attempted to take his BP but his arm went numb. Please call to advise. Thank  You.

## 2020-09-10 NOTE — TELEPHONE ENCOUNTER
Dr. Nichole Guevara saw patient yesterday while inpatient at [facility] and will be scheduling him for ablation. Please have Dr. Nichole Guevara review message and give recommendations.

## 2020-09-10 NOTE — TELEPHONE ENCOUNTER
Spoke to patient and he would rather come into office - I placed him on schedule for 12pm  To see me for placement

## 2020-09-11 ENCOUNTER — TELEPHONE (OUTPATIENT)
Dept: CARDIOLOGY CLINIC | Age: 25
End: 2020-09-11

## 2020-09-11 ENCOUNTER — NURSE ONLY (OUTPATIENT)
Dept: CARDIOLOGY CLINIC | Age: 25
End: 2020-09-11
Payer: COMMERCIAL

## 2020-09-11 PROCEDURE — 0296T PR EXT ECG > 48HR TO 21 DAY RCRD W/CONECT INTL RCRD: CPT | Performed by: INTERNAL MEDICINE

## 2020-09-11 NOTE — TELEPHONE ENCOUNTER
He got a heart monitor today and was told not to work out or get sweaty for at least one day . His job is very physical and sweaty, should he stay home today ? If he should stay home he needs a note excusing him .  Please call to advise

## 2020-09-14 NOTE — TELEPHONE ENCOUNTER
Spoke to the pt-stated he is bringing in [employer] paper work. Asking to be scheduled for the ablation.

## 2020-09-14 NOTE — TELEPHONE ENCOUNTER
Please see if he still needs the note for a day off. If so, I will complete and sign for him.  Thanks

## 2020-09-18 ENCOUNTER — TELEPHONE (OUTPATIENT)
Dept: CARDIOLOGY CLINIC | Age: 25
End: 2020-09-18

## 2020-09-18 NOTE — TELEPHONE ENCOUNTER
Type of paperwork is being dropped off: (FMLA/ DISABILITY/PATIENT ASSISTANCE)  FMLA      Who is the patient's provider: Dr. Phoebe Cardenas saw patient in hospital        Where is the paperwork to be sent: Fax: [phone].     josep

## 2020-09-25 PROCEDURE — 0298T PR EXT ECG > 48HR TO 21 DAY REVIEW AND INTERPRETATN: CPT | Performed by: INTERNAL MEDICINE

## 2020-11-09 ENCOUNTER — OFFICE VISIT (OUTPATIENT)
Dept: PRIMARY CARE CLINIC | Age: 25
End: 2020-11-09
Payer: COMMERCIAL

## 2020-11-09 PROCEDURE — 99211 OFF/OP EST MAY X REQ PHY/QHP: CPT | Performed by: NURSE PRACTITIONER

## 2020-11-09 NOTE — PATIENT INSTRUCTIONS

## 2020-11-09 NOTE — PROGRESS NOTES
Haley Bahena received a viral test for COVID-19. They were educated on isolation and quarantine as appropriate. For any symptoms, they were directed to seek care from their PCP, given contact information to establish with a doctor, directed to an urgent care or the emergency room.

## 2020-11-10 LAB — SARS-COV-2: NOT DETECTED

## 2020-11-13 ENCOUNTER — HOSPITAL ENCOUNTER (OUTPATIENT)
Dept: CARDIAC CATH/INVASIVE PROCEDURES | Age: 25
Discharge: HOME | End: 2020-11-13
Attending: INTERNAL MEDICINE | Admitting: INTERNAL MEDICINE
Payer: COMMERCIAL

## 2020-11-13 ENCOUNTER — ANESTHESIA EVENT (OUTPATIENT)
Dept: CARDIAC CATH/INVASIVE PROCEDURES | Age: 25
End: 2020-11-13
Payer: COMMERCIAL

## 2020-11-13 ENCOUNTER — ANESTHESIA (OUTPATIENT)
Dept: CARDIAC CATH/INVASIVE PROCEDURES | Age: 25
End: 2020-11-13
Payer: COMMERCIAL

## 2020-11-13 VITALS
WEIGHT: 145 LBS | SYSTOLIC BLOOD PRESSURE: 120 MMHG | RESPIRATION RATE: 28 BRPM | OXYGEN SATURATION: 99 % | TEMPERATURE: 98.1 F | HEART RATE: 93 BPM | DIASTOLIC BLOOD PRESSURE: 53 MMHG | HEIGHT: 71 IN | BODY MASS INDEX: 20.3 KG/M2

## 2020-11-13 VITALS
SYSTOLIC BLOOD PRESSURE: 138 MMHG | RESPIRATION RATE: 9 BRPM | OXYGEN SATURATION: 99 % | DIASTOLIC BLOOD PRESSURE: 65 MMHG | TEMPERATURE: 99.3 F

## 2020-11-13 LAB
A/G RATIO: 1.7 (ref 1.1–2.2)
ABO/RH: NORMAL
ALBUMIN SERPL-MCNC: 5.1 G/DL (ref 3.4–5)
ALP BLD-CCNC: 81 U/L (ref 40–129)
ALT SERPL-CCNC: 57 U/L (ref 10–40)
ANION GAP SERPL CALCULATED.3IONS-SCNC: 12 MMOL/L (ref 3–16)
ANTIBODY SCREEN: NORMAL
AST SERPL-CCNC: 33 U/L (ref 15–37)
BILIRUB SERPL-MCNC: 1.7 MG/DL (ref 0–1)
BUN BLDV-MCNC: 14 MG/DL (ref 7–20)
CALCIUM SERPL-MCNC: 10.1 MG/DL (ref 8.3–10.6)
CHLORIDE BLD-SCNC: 101 MMOL/L (ref 99–110)
CO2: 26 MMOL/L (ref 21–32)
CREAT SERPL-MCNC: 0.7 MG/DL (ref 0.9–1.3)
GFR AFRICAN AMERICAN: >60
GFR NON-AFRICAN AMERICAN: >60
GLOBULIN: 3 G/DL
GLUCOSE BLD-MCNC: 107 MG/DL (ref 70–99)
HCT VFR BLD CALC: 42.9 % (ref 40.5–52.5)
HEMOGLOBIN: 15.2 G/DL (ref 13.5–17.5)
INR BLD: 1.05 (ref 0.86–1.14)
LV EF: 58 %
LVEF MODALITY: NORMAL
MAGNESIUM: 2.2 MG/DL (ref 1.8–2.4)
MCH RBC QN AUTO: 31.2 PG (ref 26–34)
MCHC RBC AUTO-ENTMCNC: 35.5 G/DL (ref 31–36)
MCV RBC AUTO: 88.1 FL (ref 80–100)
PDW BLD-RTO: 12.8 % (ref 12.4–15.4)
PLATELET # BLD: 189 K/UL (ref 135–450)
PMV BLD AUTO: 9.5 FL (ref 5–10.5)
POC ACT LR: 174 SEC
POTASSIUM SERPL-SCNC: 4.1 MMOL/L (ref 3.5–5.1)
PROTHROMBIN TIME: 12.2 SEC (ref 10–13.2)
RBC # BLD: 4.86 M/UL (ref 4.2–5.9)
SODIUM BLD-SCNC: 139 MMOL/L (ref 136–145)
TOTAL PROTEIN: 8.1 G/DL (ref 6.4–8.2)
WBC # BLD: 8 K/UL (ref 4–11)

## 2020-11-13 PROCEDURE — 93312 ECHO TRANSESOPHAGEAL: CPT

## 2020-11-13 PROCEDURE — 2500000003 HC RX 250 WO HCPCS: Performed by: NURSE ANESTHETIST, CERTIFIED REGISTERED

## 2020-11-13 PROCEDURE — 86850 RBC ANTIBODY SCREEN: CPT

## 2020-11-13 PROCEDURE — 6360000002 HC RX W HCPCS: Performed by: NURSE ANESTHETIST, CERTIFIED REGISTERED

## 2020-11-13 PROCEDURE — 93325 DOPPLER ECHO COLOR FLOW MAPG: CPT

## 2020-11-13 PROCEDURE — 6360000002 HC RX W HCPCS

## 2020-11-13 PROCEDURE — 86900 BLOOD TYPING SEROLOGIC ABO: CPT

## 2020-11-13 PROCEDURE — 93653 COMPRE EP EVAL TX SVT: CPT

## 2020-11-13 PROCEDURE — 3700000001 HC ADD 15 MINUTES (ANESTHESIA)

## 2020-11-13 PROCEDURE — 93623 PRGRMD STIMJ&PACG IV RX NFS: CPT | Performed by: INTERNAL MEDICINE

## 2020-11-13 PROCEDURE — 3700000000 HC ANESTHESIA ATTENDED CARE

## 2020-11-13 PROCEDURE — 2500000003 HC RX 250 WO HCPCS

## 2020-11-13 PROCEDURE — C1732 CATH, EP, DIAG/ABL, 3D/VECT: HCPCS

## 2020-11-13 PROCEDURE — 80053 COMPREHEN METABOLIC PANEL: CPT

## 2020-11-13 PROCEDURE — 7100000001 HC PACU RECOVERY - ADDTL 15 MIN

## 2020-11-13 PROCEDURE — 93005 ELECTROCARDIOGRAM TRACING: CPT

## 2020-11-13 PROCEDURE — 85347 COAGULATION TIME ACTIVATED: CPT

## 2020-11-13 PROCEDURE — 93662 INTRACARDIAC ECG (ICE): CPT | Performed by: INTERNAL MEDICINE

## 2020-11-13 PROCEDURE — C1730 CATH, EP, 19 OR FEW ELECT: HCPCS

## 2020-11-13 PROCEDURE — 93621 COMP EP EVL L PAC&REC C SINS: CPT | Performed by: INTERNAL MEDICINE

## 2020-11-13 PROCEDURE — C1769 GUIDE WIRE: HCPCS

## 2020-11-13 PROCEDURE — 2580000003 HC RX 258

## 2020-11-13 PROCEDURE — C1894 INTRO/SHEATH, NON-LASER: HCPCS

## 2020-11-13 PROCEDURE — 93662 INTRACARDIAC ECG (ICE): CPT

## 2020-11-13 PROCEDURE — 93320 DOPPLER ECHO COMPLETE: CPT

## 2020-11-13 PROCEDURE — 93010 ELECTROCARDIOGRAM REPORT: CPT | Performed by: INTERNAL MEDICINE

## 2020-11-13 PROCEDURE — 36415 COLL VENOUS BLD VENIPUNCTURE: CPT

## 2020-11-13 PROCEDURE — 2580000003 HC RX 258: Performed by: NURSE ANESTHETIST, CERTIFIED REGISTERED

## 2020-11-13 PROCEDURE — 93621 COMP EP EVL L PAC&REC C SINS: CPT

## 2020-11-13 PROCEDURE — 93613 INTRACARDIAC EPHYS 3D MAPG: CPT

## 2020-11-13 PROCEDURE — C1759 CATH, INTRA ECHOCARDIOGRAPHY: HCPCS

## 2020-11-13 PROCEDURE — 99283 EMERGENCY DEPT VISIT LOW MDM: CPT

## 2020-11-13 PROCEDURE — 93623 PRGRMD STIMJ&PACG IV RX NFS: CPT

## 2020-11-13 PROCEDURE — 85610 PROTHROMBIN TIME: CPT

## 2020-11-13 PROCEDURE — 83735 ASSAY OF MAGNESIUM: CPT

## 2020-11-13 PROCEDURE — 7100000000 HC PACU RECOVERY - FIRST 15 MIN

## 2020-11-13 PROCEDURE — 93653 COMPRE EP EVAL TX SVT: CPT | Performed by: INTERNAL MEDICINE

## 2020-11-13 PROCEDURE — 85027 COMPLETE CBC AUTOMATED: CPT

## 2020-11-13 PROCEDURE — 93005 ELECTROCARDIOGRAM TRACING: CPT | Performed by: INTERNAL MEDICINE

## 2020-11-13 PROCEDURE — 86901 BLOOD TYPING SEROLOGIC RH(D): CPT

## 2020-11-13 PROCEDURE — 93613 INTRACARDIAC EPHYS 3D MAPG: CPT | Performed by: INTERNAL MEDICINE

## 2020-11-13 RX ORDER — FENTANYL CITRATE 50 UG/ML
50 INJECTION, SOLUTION INTRAMUSCULAR; INTRAVENOUS EVERY 5 MIN PRN
Status: DISCONTINUED | OUTPATIENT
Start: 2020-11-13 | End: 2020-11-13 | Stop reason: HOSPADM

## 2020-11-13 RX ORDER — HYDROMORPHONE HCL 110MG/55ML
0.25 PATIENT CONTROLLED ANALGESIA SYRINGE INTRAVENOUS EVERY 5 MIN PRN
Status: DISCONTINUED | OUTPATIENT
Start: 2020-11-13 | End: 2020-11-13 | Stop reason: HOSPADM

## 2020-11-13 RX ORDER — HYDROMORPHONE HCL 110MG/55ML
0.5 PATIENT CONTROLLED ANALGESIA SYRINGE INTRAVENOUS EVERY 5 MIN PRN
Status: DISCONTINUED | OUTPATIENT
Start: 2020-11-13 | End: 2020-11-13 | Stop reason: HOSPADM

## 2020-11-13 RX ORDER — SODIUM CHLORIDE 9 MG/ML
INJECTION, SOLUTION INTRAVENOUS CONTINUOUS PRN
Status: DISCONTINUED | OUTPATIENT
Start: 2020-11-13 | End: 2020-11-13 | Stop reason: SDUPTHER

## 2020-11-13 RX ORDER — METOPROLOL TARTRATE 5 MG/5ML
INJECTION INTRAVENOUS PRN
Status: DISCONTINUED | OUTPATIENT
Start: 2020-11-13 | End: 2020-11-13 | Stop reason: SDUPTHER

## 2020-11-13 RX ORDER — SODIUM CHLORIDE 9 MG/ML
INJECTION, SOLUTION INTRAVENOUS CONTINUOUS
Status: CANCELLED | OUTPATIENT
Start: 2020-11-13

## 2020-11-13 RX ORDER — LIDOCAINE HYDROCHLORIDE 10 MG/ML
1 INJECTION, SOLUTION EPIDURAL; INFILTRATION; INTRACAUDAL; PERINEURAL
Status: CANCELLED | OUTPATIENT
Start: 2020-11-13 | End: 2020-11-13

## 2020-11-13 RX ORDER — FENTANYL CITRATE 50 UG/ML
INJECTION, SOLUTION INTRAMUSCULAR; INTRAVENOUS PRN
Status: DISCONTINUED | OUTPATIENT
Start: 2020-11-13 | End: 2020-11-13 | Stop reason: SDUPTHER

## 2020-11-13 RX ORDER — HYDROCODONE BITARTRATE AND ACETAMINOPHEN 5; 325 MG/1; MG/1
2 TABLET ORAL PRN
Status: DISCONTINUED | OUTPATIENT
Start: 2020-11-13 | End: 2020-11-13 | Stop reason: HOSPADM

## 2020-11-13 RX ORDER — SODIUM CHLORIDE 0.9 % (FLUSH) 0.9 %
10 SYRINGE (ML) INJECTION PRN
Status: CANCELLED | OUTPATIENT
Start: 2020-11-13

## 2020-11-13 RX ORDER — SUCCINYLCHOLINE CHLORIDE 20 MG/ML
INJECTION INTRAMUSCULAR; INTRAVENOUS PRN
Status: DISCONTINUED | OUTPATIENT
Start: 2020-11-13 | End: 2020-11-13 | Stop reason: SDUPTHER

## 2020-11-13 RX ORDER — ONDANSETRON 2 MG/ML
INJECTION INTRAMUSCULAR; INTRAVENOUS PRN
Status: DISCONTINUED | OUTPATIENT
Start: 2020-11-13 | End: 2020-11-13 | Stop reason: SDUPTHER

## 2020-11-13 RX ORDER — PROPOFOL 10 MG/ML
INJECTION, EMULSION INTRAVENOUS PRN
Status: DISCONTINUED | OUTPATIENT
Start: 2020-11-13 | End: 2020-11-13 | Stop reason: SDUPTHER

## 2020-11-13 RX ORDER — HYDROCODONE BITARTRATE AND ACETAMINOPHEN 5; 325 MG/1; MG/1
1 TABLET ORAL PRN
Status: DISCONTINUED | OUTPATIENT
Start: 2020-11-13 | End: 2020-11-13 | Stop reason: HOSPADM

## 2020-11-13 RX ORDER — FENTANYL CITRATE 50 UG/ML
25 INJECTION, SOLUTION INTRAMUSCULAR; INTRAVENOUS EVERY 5 MIN PRN
Status: DISCONTINUED | OUTPATIENT
Start: 2020-11-13 | End: 2020-11-13 | Stop reason: HOSPADM

## 2020-11-13 RX ORDER — DIPHENHYDRAMINE HYDROCHLORIDE 50 MG/ML
12.5 INJECTION INTRAMUSCULAR; INTRAVENOUS
Status: DISCONTINUED | OUTPATIENT
Start: 2020-11-13 | End: 2020-11-13 | Stop reason: HOSPADM

## 2020-11-13 RX ORDER — DEXAMETHASONE SODIUM PHOSPHATE 4 MG/ML
INJECTION, SOLUTION INTRA-ARTICULAR; INTRALESIONAL; INTRAMUSCULAR; INTRAVENOUS; SOFT TISSUE PRN
Status: DISCONTINUED | OUTPATIENT
Start: 2020-11-13 | End: 2020-11-13 | Stop reason: SDUPTHER

## 2020-11-13 RX ORDER — GLYCOPYRROLATE 1 MG/5 ML
SYRINGE (ML) INTRAVENOUS PRN
Status: DISCONTINUED | OUTPATIENT
Start: 2020-11-13 | End: 2020-11-13 | Stop reason: SDUPTHER

## 2020-11-13 RX ORDER — MIDAZOLAM HYDROCHLORIDE 1 MG/ML
INJECTION INTRAMUSCULAR; INTRAVENOUS PRN
Status: DISCONTINUED | OUTPATIENT
Start: 2020-11-13 | End: 2020-11-13 | Stop reason: SDUPTHER

## 2020-11-13 RX ORDER — PROMETHAZINE HYDROCHLORIDE 25 MG/ML
6.25 INJECTION, SOLUTION INTRAMUSCULAR; INTRAVENOUS EVERY 30 MIN PRN
Status: DISCONTINUED | OUTPATIENT
Start: 2020-11-13 | End: 2020-11-13 | Stop reason: HOSPADM

## 2020-11-13 RX ORDER — SODIUM CHLORIDE 0.9 % (FLUSH) 0.9 %
10 SYRINGE (ML) INJECTION EVERY 12 HOURS SCHEDULED
Status: CANCELLED | OUTPATIENT
Start: 2020-11-13

## 2020-11-13 RX ORDER — LIDOCAINE HYDROCHLORIDE 20 MG/ML
INJECTION, SOLUTION EPIDURAL; INFILTRATION; INTRACAUDAL; PERINEURAL PRN
Status: DISCONTINUED | OUTPATIENT
Start: 2020-11-13 | End: 2020-11-13 | Stop reason: SDUPTHER

## 2020-11-13 RX ORDER — MEPERIDINE HYDROCHLORIDE 25 MG/ML
12.5 INJECTION INTRAMUSCULAR; INTRAVENOUS; SUBCUTANEOUS EVERY 5 MIN PRN
Status: DISCONTINUED | OUTPATIENT
Start: 2020-11-13 | End: 2020-11-13 | Stop reason: HOSPADM

## 2020-11-13 RX ORDER — HEPARIN SODIUM 1000 [USP'U]/ML
INJECTION, SOLUTION INTRAVENOUS; SUBCUTANEOUS PRN
Status: DISCONTINUED | OUTPATIENT
Start: 2020-11-13 | End: 2020-11-13 | Stop reason: SDUPTHER

## 2020-11-13 RX ADMIN — PHENYLEPHRINE HYDROCHLORIDE 100 MCG/MIN: 10 INJECTION INTRAVENOUS at 09:20

## 2020-11-13 RX ADMIN — MIDAZOLAM 2 MG: 1 INJECTION INTRAMUSCULAR; INTRAVENOUS at 07:50

## 2020-11-13 RX ADMIN — PHENYLEPHRINE HYDROCHLORIDE 200 MCG: 10 INJECTION INTRAVENOUS at 08:47

## 2020-11-13 RX ADMIN — MIDAZOLAM 2 MG: 1 INJECTION INTRAMUSCULAR; INTRAVENOUS at 07:30

## 2020-11-13 RX ADMIN — SODIUM CHLORIDE: 9 INJECTION, SOLUTION INTRAVENOUS at 07:38

## 2020-11-13 RX ADMIN — PROPOFOL 300 MG: 10 INJECTION, EMULSION INTRAVENOUS at 07:50

## 2020-11-13 RX ADMIN — SUCCINYLCHOLINE CHLORIDE 160 MG: 20 INJECTION, SOLUTION INTRAMUSCULAR; INTRAVENOUS at 07:50

## 2020-11-13 RX ADMIN — METOPROLOL TARTRATE 2.5 MG: 1 INJECTION, SOLUTION INTRAVENOUS at 10:04

## 2020-11-13 RX ADMIN — FENTANYL CITRATE 50 MCG: 50 INJECTION INTRAMUSCULAR; INTRAVENOUS at 07:50

## 2020-11-13 RX ADMIN — ISOPROTERENOL HYDROCHLORIDE 2 MCG/MIN: 0.2 INJECTION, SOLUTION INTRAMUSCULAR; INTRAVENOUS at 09:41

## 2020-11-13 RX ADMIN — SODIUM CHLORIDE: 9 INJECTION, SOLUTION INTRAVENOUS at 08:30

## 2020-11-13 RX ADMIN — LIDOCAINE HYDROCHLORIDE 100 MG: 20 INJECTION, SOLUTION EPIDURAL; INFILTRATION; INTRACAUDAL; PERINEURAL at 07:50

## 2020-11-13 RX ADMIN — ONDANSETRON 4 MG: 2 INJECTION INTRAMUSCULAR; INTRAVENOUS at 07:54

## 2020-11-13 RX ADMIN — PHENYLEPHRINE HYDROCHLORIDE 200 MCG: 10 INJECTION INTRAVENOUS at 09:13

## 2020-11-13 RX ADMIN — DEXAMETHASONE SODIUM PHOSPHATE 4 MG: 4 INJECTION, SOLUTION INTRAMUSCULAR; INTRAVENOUS at 07:54

## 2020-11-13 RX ADMIN — HEPARIN SODIUM 3000 UNITS: 1000 INJECTION INTRAVENOUS; SUBCUTANEOUS at 08:19

## 2020-11-13 RX ADMIN — Medication 0.2 MG: at 08:47

## 2020-11-13 RX ADMIN — FENTANYL CITRATE 50 MCG: 50 INJECTION INTRAMUSCULAR; INTRAVENOUS at 11:30

## 2020-11-13 RX ADMIN — PHENYLEPHRINE HYDROCHLORIDE 200 MCG: 10 INJECTION INTRAVENOUS at 09:19

## 2020-11-13 RX ADMIN — PHENYLEPHRINE HYDROCHLORIDE 100 MCG: 10 INJECTION INTRAVENOUS at 09:07

## 2020-11-13 RX ADMIN — PROPOFOL 50 MG: 10 INJECTION, EMULSION INTRAVENOUS at 09:46

## 2020-11-13 RX ADMIN — PHENYLEPHRINE HYDROCHLORIDE 100 MCG: 10 INJECTION INTRAVENOUS at 09:04

## 2020-11-13 ASSESSMENT — PULMONARY FUNCTION TESTS
PIF_VALUE: 17
PIF_VALUE: 14
PIF_VALUE: 19
PIF_VALUE: 17
PIF_VALUE: 16
PIF_VALUE: 17
PIF_VALUE: 19
PIF_VALUE: 3
PIF_VALUE: 15
PIF_VALUE: 16
PIF_VALUE: 17
PIF_VALUE: 18
PIF_VALUE: 16
PIF_VALUE: 18
PIF_VALUE: 0
PIF_VALUE: 19
PIF_VALUE: 16
PIF_VALUE: 16
PIF_VALUE: 17
PIF_VALUE: 19
PIF_VALUE: 17
PIF_VALUE: 17
PIF_VALUE: 19
PIF_VALUE: 17
PIF_VALUE: 16
PIF_VALUE: 18
PIF_VALUE: 16
PIF_VALUE: 17
PIF_VALUE: 17
PIF_VALUE: 16
PIF_VALUE: 17
PIF_VALUE: 1
PIF_VALUE: 15
PIF_VALUE: 16
PIF_VALUE: 15
PIF_VALUE: 1
PIF_VALUE: 16
PIF_VALUE: 16
PIF_VALUE: 17
PIF_VALUE: 16
PIF_VALUE: 12
PIF_VALUE: 15
PIF_VALUE: 17
PIF_VALUE: 19
PIF_VALUE: 16
PIF_VALUE: 17
PIF_VALUE: 16
PIF_VALUE: 13
PIF_VALUE: 1
PIF_VALUE: 17
PIF_VALUE: 1
PIF_VALUE: 17
PIF_VALUE: 16
PIF_VALUE: 17
PIF_VALUE: 15
PIF_VALUE: 15
PIF_VALUE: 18
PIF_VALUE: 17
PIF_VALUE: 17
PIF_VALUE: 18
PIF_VALUE: 16
PIF_VALUE: 19
PIF_VALUE: 16
PIF_VALUE: 17
PIF_VALUE: 15
PIF_VALUE: 1
PIF_VALUE: 17
PIF_VALUE: 17
PIF_VALUE: 1
PIF_VALUE: 18
PIF_VALUE: 17
PIF_VALUE: 16
PIF_VALUE: 16
PIF_VALUE: 17
PIF_VALUE: 3
PIF_VALUE: 16
PIF_VALUE: 17
PIF_VALUE: 18
PIF_VALUE: 17
PIF_VALUE: 1
PIF_VALUE: 2
PIF_VALUE: 17
PIF_VALUE: 17
PIF_VALUE: 16
PIF_VALUE: 1
PIF_VALUE: 18
PIF_VALUE: 16
PIF_VALUE: 17
PIF_VALUE: 19
PIF_VALUE: 16
PIF_VALUE: 17
PIF_VALUE: 17
PIF_VALUE: 16
PIF_VALUE: 17
PIF_VALUE: 16
PIF_VALUE: 17
PIF_VALUE: 16
PIF_VALUE: 16
PIF_VALUE: 15
PIF_VALUE: 17
PIF_VALUE: 13
PIF_VALUE: 17
PIF_VALUE: 18
PIF_VALUE: 17
PIF_VALUE: 16
PIF_VALUE: 17
PIF_VALUE: 17
PIF_VALUE: 16
PIF_VALUE: 17
PIF_VALUE: 16
PIF_VALUE: 17
PIF_VALUE: 2
PIF_VALUE: 19
PIF_VALUE: 15
PIF_VALUE: 0
PIF_VALUE: 17
PIF_VALUE: 13
PIF_VALUE: 17
PIF_VALUE: 16
PIF_VALUE: 16
PIF_VALUE: 17
PIF_VALUE: 17
PIF_VALUE: 16
PIF_VALUE: 17
PIF_VALUE: 1
PIF_VALUE: 18
PIF_VALUE: 17
PIF_VALUE: 19
PIF_VALUE: 19
PIF_VALUE: 16
PIF_VALUE: 16
PIF_VALUE: 17
PIF_VALUE: 17
PIF_VALUE: 16
PIF_VALUE: 16
PIF_VALUE: 17
PIF_VALUE: 18
PIF_VALUE: 1
PIF_VALUE: 18
PIF_VALUE: 18
PIF_VALUE: 1
PIF_VALUE: 16
PIF_VALUE: 18
PIF_VALUE: 16
PIF_VALUE: 16
PIF_VALUE: 15
PIF_VALUE: 15
PIF_VALUE: 16
PIF_VALUE: 17
PIF_VALUE: 16
PIF_VALUE: 16
PIF_VALUE: 17
PIF_VALUE: 20
PIF_VALUE: 17
PIF_VALUE: 17
PIF_VALUE: 15
PIF_VALUE: 2
PIF_VALUE: 16
PIF_VALUE: 17
PIF_VALUE: 17
PIF_VALUE: 15
PIF_VALUE: 17
PIF_VALUE: 17
PIF_VALUE: 16
PIF_VALUE: 17
PIF_VALUE: 17
PIF_VALUE: 16
PIF_VALUE: 13
PIF_VALUE: 16
PIF_VALUE: 1
PIF_VALUE: 17
PIF_VALUE: 15
PIF_VALUE: 17
PIF_VALUE: 17
PIF_VALUE: 15
PIF_VALUE: 0
PIF_VALUE: 21
PIF_VALUE: 17
PIF_VALUE: 17
PIF_VALUE: 12
PIF_VALUE: 17
PIF_VALUE: 18
PIF_VALUE: 17
PIF_VALUE: 17
PIF_VALUE: 1
PIF_VALUE: 16
PIF_VALUE: 16
PIF_VALUE: 5
PIF_VALUE: 15
PIF_VALUE: 16
PIF_VALUE: 16
PIF_VALUE: 15
PIF_VALUE: 15
PIF_VALUE: 16
PIF_VALUE: 1
PIF_VALUE: 17
PIF_VALUE: 16
PIF_VALUE: 17
PIF_VALUE: 1
PIF_VALUE: 15
PIF_VALUE: 18
PIF_VALUE: 16
PIF_VALUE: 17
PIF_VALUE: 16
PIF_VALUE: 17
PIF_VALUE: 17
PIF_VALUE: 15
PIF_VALUE: 17
PIF_VALUE: 19
PIF_VALUE: 1
PIF_VALUE: 18
PIF_VALUE: 17
PIF_VALUE: 2
PIF_VALUE: 16
PIF_VALUE: 17

## 2020-11-13 NOTE — H&P
[address]   Electrophysiology Consultation   Date: 11/13/2020  Reason for Consultation: Palpitation, Tachycardia   Consult Requesting Physician: Bakari Carbajal MD     CC: Palpitation   HPI: Susanna Mueller is a 26 y.o. male history of WPW s/p ablation para-hisian pathway using cryoablation on 05/03/2018. Prior to ablation he had recurrent palpitation, and SVT. He also had atrial fibrillation with RVR with pre-excitation and conduction over AP requiring cardioversion prior to his ablation. On 3/26/2019 in follow up visit, he was found to have recurrence of accessory pathway conduction with preexcitation on his ECG. He had GXT which showed disappearance of preexcitation with exercise with heart rates > 80 bpm.      Later developed atrial fibrillation and treated with Flecainide. Patient states that he was sitting at home and suddenly felt palpitation. Palpitation continued and he developed a lightheadedness and dizziness and chest pressure. He came to the emergency room found to be in atrial fibrillation with rapid ventricular rate. On arrival to ER he was in atrial fibrillation with rapid ventricular rates with no preexcitation on his ECG. He received Procainamide IV infusion in ER. Sudden onset of palpitation, dizziness and lightheadedness. Patient states that he stopped flecainide after 1 month of taking it. He did not have any episodes till now. He denies drinking alcohol however has energy drinks twice a week. Past Medical History:   Diagnosis Date    Pneumonia         Past Surgical History:   Procedure Laterality Date    TYMPANOSTOMY TUBE PLACEMENT         No Known Allergies    Social History:  Reviewed. reports that he has never smoked. He has never used smokeless tobacco. He reports that he does not drink alcohol or use drugs. Family History:  Reviewed. family history includes Diabetes in his father.      Review of System:  All other systems reviewed except for that noted above. Pertinent negatives and positives are:       · General: negative for fever, chills   · Ophthalmic ROS: negative for - eye pain or loss of vision  · ENT ROS: negative for - headaches, sore throat   · Respiratory: negative for - cough, sputum  · Cardiovascular: Reviewed in HPI  · Gastrointestinal: negative for - abdominal pain, diarrhea, N/V  · Hematology: negative for - bleeding, blood clots, bruising or jaundice  · Genito-Urinary:  negative for - Dysuria or incontinence  · Musculoskeletal: negative for - Joint swelling, muscle pain  · Neurological: negative for - confusion, + dizziness, headaches   · Psychiatric: No anxiety, no depression. · Dermatological: negative for - rash    Physical Examination:  Vitals:    20 0649   BP: 138/87   Pulse: 106   Temp: 98 °F (36.7 °C)   SpO2: 98%      No intake/output data recorded. Wt Readings from Last 3 Encounters:   20 145 lb (65.8 kg)   20 157 lb 6.4 oz (71.4 kg)   19 148 lb (67.1 kg)     Temp  Av °F (36.7 °C)  Min: 98 °F (36.7 °C)  Max: 98 °F (36.7 °C)  Pulse  Av  Min: 106  Max: 106  BP  Min: 138/87  Max: 138/87  SpO2  Av %  Min: 98 %  Max: 98 %  No intake or output data in the 24 hours ending 20 0733    · Telemetry: Sinus rhythm   · Constitutional: Oriented. No distress. · Head: Normocephalic and atraumatic. · Mouth/Throat: Oropharynx is clear and moist.   · Eyes: Conjunctivae normal. EOM are normal.   · Neck: Neck supple. No rigidity. No JVD present. · Cardiovascular: Normal rate, regular rhythm, S1&S2. · Pulmonary/Chest: Bilateral respiratory sounds. No wheezes, No rhonchi. · Abdominal: Soft. Bowel sounds present. No distension, No tenderness. · Musculoskeletal: No tenderness. No edema    · Lymphadenopathy: Has no cervical adenopathy. · Neurological: Alert and oriented. Cranial nerve appears intact, No Gross deficit   · Skin: Skin is warm and dry. No rash noted.    · Psychiatric: Has a normal behavior     Labs, diagnostic and imaging results reviewed. Reviewed. Recent Labs     11/13/20  0650      K 4.1      CO2 26   BUN 14   CREATININE 0.7*     Recent Labs     11/13/20  0650   WBC 8.0   HGB 15.2   HCT 42.9   MCV 88.1        Lab Results   Component Value Date    TROPONINI <0.01 09/08/2020     No results found for: BNP  Lab Results   Component Value Date    PROTIME 12.2 11/13/2020    PROTIME 12.1 09/09/2020    PROTIME 11.7 05/02/2018    INR 1.05 11/13/2020    INR 1.04 09/09/2020    INR 1.04 05/02/2018     No results found for: CHOL, HDL, TRIG    ECG: Atrial fibrillation with RVR     Sinus tachycardia, incomplete RBBB     Echo: 2016:   Left ventricle - normal size, thickness and function with EF of 60%     Tricuspid valve - trivial regurgitation      Scheduled Meds:    Continuous Infusions:    PRN Meds:. HYDROmorphone, HYDROmorphone, fentanNYL, fentanNYL, HYDROcodone 5 mg - acetaminophen **OR** HYDROcodone 5 mg - acetaminophen, diphenhydrAMINE, promethazine, meperidine     Patient Active Problem List    Diagnosis Date Noted    Tachycardia 05/02/2018     Priority: Low    WPW (Yordan-Parkinson-White syndrome) 05/02/2018     Priority: Low    Atrial fibrillation (HCC)      Priority: Low    Palpitation      Priority: Low    WPW syndrome 09/08/2020      There are no active hospital problems to display for this patient. Assessment:   - Paroxysmal atrial fibrillation with RVR    Severe exacerbation with tachycardia, LH, dizziness and palpitation. We discussed treatment options including antiarrhythmics, rate control with anticoagulation, and ablation. We discussed progressive nature of atrial fibrillation. Treatment success decreases when AF becomes persistent and last more than 6 months. Antiarrhythmic therapy, side effects, benefits and alternative discussed. Atrial fibrillation ablation procedure was discussed. We discussed the need for repeat procedure.  On average patients may need more than one ablation procedure. Risks associated with ablation include but not limited to allergic reaction to the medications, pain, bleeding, infection, nerve injury, injury to diaphragm(breathing muscle), pulmonary embolus(blood clot in lungs), deep vein blood clot, pneumothorax, hemothorax, acute renal failure, cardiac perforation,  tamponade, need for emergent surgery (open heart), permanent pacemaker, pulmonary vein stenosis, left atrial to esophageal fistula, stroke, myocardial infarction and death. Difference between atrial fibrillation and atrial flutter discussed and treatment discussed. Patient verbalized understanding of procedure, risks and benefits and wishes to proceed with ablation. Will schedule for RF ablation and Carto navigation system. Educational information about ablation was provided and patient was encouraged to review information and call back with any questions. Discussed also antiarrhythmic therapy and medical therapy with patient. He will use as needed Cardizem if he has tachycardia. - WPW syndrome:    Patient has history of WPW syndrome and ablation. Although it appears that his accessory pathway conduction had recurred after ablation but conduction was intermittent and with the rate more than 80 bpm preexcitation was no longer present on his GXT. Also on arrival his ECG was narrow complex with no preexcitation. I do not see any preexcitation on his rest ECG too. Since he opted for EP study and ablation will check for preexcitation and accessory pathway conduction during A. fib ablation.    - The patient is counseled to avoid excess caffeine, and energy drinks as this may exacerbated ectopy and arrhythmia. - The patient is counseled to get regular exercise 3-5 times per week to control cardiovascular risk factors.      Thank you for allowing me to participate in the care of Norris Jesus     I independently reviewed his ECG,

## 2020-11-13 NOTE — PROGRESS NOTES
Patient transferred from Cath lab to PACU, VSS. Right femoral dressing CDI, surrounding area soft. Right pedal pulses present. Denies pain. Continue to monitor.

## 2020-11-13 NOTE — ANESTHESIA PRE PROCEDURE
Department of Anesthesiology  Preprocedure Note       Name:  Licha Dewitt   Age:  26 y. o.  :  1995                                          MRN:  [id number]         Date:  2020      Surgeon: * Surgery not found *    Procedure:     Medications prior to admission:   Prior to Admission medications    Medication Sig Start Date End Date Taking? Authorizing Provider   dilTIAZem (CARDIZEM) 60 MG tablet Take 1 tablet by mouth 4 times daily as needed (For episodes of palpitations and fast pulse rate) 20   MAHAD Mitchell CNP       Current medications:    No current facility-administered medications for this encounter. Allergies:  No Known Allergies    Problem List:    Patient Active Problem List   Diagnosis Code    Tachycardia R00.0    WPW (Yordan-Parkinson-White syndrome) I45.6    Atrial fibrillation (HCC) I48.91    Palpitation R00.2    WPW syndrome I45.6       Past Medical History:        Diagnosis Date    Pneumonia        Past Surgical History:        Procedure Laterality Date    TYMPANOSTOMY TUBE PLACEMENT         Social History:    Social History     Tobacco Use    Smoking status: Never Smoker    Smokeless tobacco: Never Used   Substance Use Topics    Alcohol use: No                                Counseling given: Not Answered      Vital Signs (Current): There were no vitals filed for this visit.                                            BP Readings from Last 3 Encounters:   20 (!) 112/57   19 124/70   19 127/71       NPO Status:                                                                                 BMI:   Wt Readings from Last 3 Encounters:   20 157 lb 6.4 oz (71.4 kg)   19 148 lb (67.1 kg)   19 145 lb 6.4 oz (66 kg)     There is no height or weight on file to calculate BMI.    CBC:   Lab Results   Component Value Date    WBC 10.1 2020    RBC 4.22 2020    HGB 13.3 2020    HCT 37.8 2020    MCV 89.6 2020 RDW 12.6 09/09/2020     09/09/2020       CMP:   Lab Results   Component Value Date     09/09/2020    K 4.1 09/09/2020     09/09/2020    CO2 26 09/09/2020    BUN 13 09/09/2020    CREATININE 0.7 09/09/2020    GFRAA >60 09/09/2020    AGRATIO 1.8 09/09/2020    LABGLOM >60 09/09/2020    GLUCOSE 101 09/09/2020    PROT 6.5 09/09/2020    CALCIUM 9.2 09/09/2020    BILITOT 0.9 09/09/2020    ALKPHOS 69 09/09/2020    AST 26 09/09/2020    ALT 47 09/09/2020       POC Tests: No results for input(s): POCGLU, POCNA, POCK, POCCL, POCBUN, POCHEMO, POCHCT in the last 72 hours. Coags:   Lab Results   Component Value Date    PROTIME 12.1 09/09/2020    INR 1.04 09/09/2020    APTT 29.0 05/02/2018       HCG (If Applicable): No results found for: PREGTESTUR, PREGSERUM, HCG, HCGQUANT     ABGs: No results found for: PHART, PO2ART, QZC0EWJ, CXG5MTY, BEART, O2PBUCHW     Type & Screen (If Applicable):  No results found for: LABABO, LABRH    Drug/Infectious Status (If Applicable):  No results found for: HIV, HEPCAB    COVID-19 Screening (If Applicable):   Lab Results   Component Value Date    COVID19 Not Detected 11/09/2020         Anesthesia Evaluation  Patient summary reviewed and Nursing notes reviewed  Airway: Mallampati: II  TM distance: >3 FB   Neck ROM: full  Mouth opening: > = 3 FB Dental:          Pulmonary:                              Cardiovascular:  Exercise tolerance: good (>4 METS),   (+) dysrhythmias: atrial fibrillation,                ROS comment: WPW     Neuro/Psych:               GI/Hepatic/Renal:             Endo/Other:                     Abdominal:           Vascular:                                        Anesthesia Plan      general     ASA 2     (UPPER AND LOWER RETAINER)  Induction: intravenous and rapid sequence. MIPS: Postoperative opioids intended, Prophylactic antiemetics administered and Postoperative trial extubation. Anesthetic plan and risks discussed with patient.       Plan discussed with CRNA.                 Erum Dickson MD   11/13/2020

## 2020-11-13 NOTE — PROGRESS NOTES
Patient awake, VSS. Right femoral dressing CDI and surrounding area soft. Right pedal and posterior tibial pulses present. All phase 1 discharge criteria met. Will transfer to cath lab for discharge.

## 2020-11-13 NOTE — PROCEDURES
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  Three-dimensional electroanatomic mapping of the right atrium    Left atrial recording and mapping via coronary sinus    Intracardiac ultrasound     Indications for procedure: SVT, Palpitation   Norris Lee 26 y.o. male  history of WPW s/p ablation para-hisian pathway using cryoablation on 05/03/2018 by Dr. Santino Shaffer. Prior to ablation he had recurrent palpitation, and SVT. He also had atrial fibrillation with RVR with pre-excitation and conduction over AP requiring cardioversion prior to his ablation. After ablation, on 3/26/2019 in follow up visit, he was found to have recurrence of accessory pathway conduction with preexcitation on his ECG. He had GXT which showed disappearance of preexcitation with exercise with heart rates > 80 bpm. Later developed atrial fibrillation and treated with Flecainide. However, had recurrent SVT and palpitation with was very symptomatic with lightheadedness, dizziness and chest pressure and led to hospitalization. On arrival to ER he was in atrial fibrillation with rapid ventricular rates with no preexcitation on his ECG. He received Procainamide IV infusion in ER and admitted. He was seen and notice preexcitation and treatment options discussed and he opted to proceed with EP study and ablation. He is here today for further evaluation of SVT and atrial fibrillation and possible ablation of atrial fibrillation. Details of Procedure: The risks, benefits and alternatives of the ablation procedure were discussed with the patient.  The risks including, but not limited to, the risks of bleeding, infection, radiation exposure, injury to vascular, cardiac and surrounding structures (including pneumothorax), stroke, cardiac perforation, tamponade, need for emergent open heart surgery, need for pacemaker implantation, myocardial infarction and death were discussed in detail. The patient opted to proceed with the ablation. Written informed consent was signed and placed in the chart. The patient was brought to the electrophysiology lab in a fasting nonsedated state. He underwent general anesthesia. Ultrasound was used for femoral venous access. The femoral vein was identified with real time visualization of needle passage to the venous lumen. We gained access to right femoral vein. Three 8F, sheath used and were placed in the right femoral vein using modified Seldinger technique and ultrasound guidance. A decapolar catheter was advanced into the coronary sinus under fluoroscopy so the distal poles were in the coronary sinus for left atrial recording and mapping. Intracardiac ultrasound was used and 3D electro anatomical mapping using Valene Sensing system and intracardiac ultrasound was created. Patient developed recurrent episodes of SVT with movement of coronary sinus catheters in the right atrium. Tachycardia cycle length was 350 ms. Tachycardia was a narrow complex tachycardia and spontaneously terminated with an A suggesting this is not an atrial tachycardia. This occurred at the beginning of the procedure before we have His and RV catheter in place. Patient had recurrent episodes of this tachycardia. A quadripolar catheter was placed on his position. We also advanced our 4 mm ablation catheter which was placed in the RV for further evaluation of this narrow complex tachycardia.       . OK interval 80 msec with manifest preexcitation with positive delta wave on II, III, aVF, isoelectric-negative in V1, positive in I.    . QRS duration 138 msec (pre-excited suggestive of septal right sided pathway  . A-H interval of 50 and H-V interval of 20 ms with manifest pre-exciation. Hesham Herrera 1:1 conduction of over accessory pathway was 410 msec   . 1:1 conduction over AV node was 310 ms  . Accessory pathway ERP was 600/410 ms  . AVN ERP was 400/310/310 ms    . 1:1 retrograde conduction over accessory pathway was 360 ms   . Retrograde conduction was centric with earliest atrial activity on His cathter      Patient had intermittent right bundle branch block aberrancy with pacing. Patient was started on isuprel 2 mcg. A sustained supraventricular narrow complex  tachyarrhythmia was induced. Tachycardia cycle length was 350 ms with concentric retrograde activation and earliest atrial activity on the His bundle catheter suggestive of retrograde conduction over septal parahisian accessory pathway. His-refractory PVC terminated tachycardia without conduction to atrium confirming participation of the pathway in tachycardia mechanism. 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 This was despite lack of robust antegrade conduction over pathway. Patient had brief episodes of atrial fibrillation which was nonsustained. It appears that his SVT triggers atrial fibrillation. Decision was made to proceed with ablation of accessory pathway. Although he has had atrial fibrillation in the past, but it was thought that it was triggered by his SVT. Ablation & Mapping   A 4 mm irrigated tip ablation catheter was used for mapping of accessory pathway. Mapping and ablation was very challenging due to location of accessory pathway and intermittent full preexcitation. For mapping we use intermittent boluses of adenosine to have maximal preexcitation. During maximal preexcitation we mapped the earliest ventricular activity. This location was close to the His bundle location and there was a small his potential on distal ablation catheter.   A location slightly distal to His bundle location, earliest ventricular activation was about 20 ms earlier than delta wave on surface ECG. There was also QS on unipolar cathehter. Then while pacing from atrium at 500 ms cycle length and monitoring AH and HV interval during atrial pacing, A lesion was placed on the earliest ventricular activity using 27 W. This resulted in disappearance of delta wave and preexcitation. Multiple doses of adenosine were given through central line. This initially showed no preexcitation and disappearance of accessory pathway conduction. However after 35 minutes waiting and using adenosine, noticed recurrence of delta wave and pre-exciation. Again mapping of the earliest ventricular activity during maximal preexcitation induced by intermittent boluses of adenosine was performed. The new location was a slightly superior to the prior ablation lesion. At this location the ventricular activation was again about 20 ms earlier than delta wave on the surface ECG. There was also a QS on unipolar electrogram. While pacing from atrium at 500 ms and monitoring AH and HV interval and AV conduction during atrial pacing, a lesion was placed on this earliest ventricular activity. We again used multiple doses of adenosine to block the AV node and confirmed lack of conduction over accessory pathway with disappearance of delta wave and elimination of preexcitation. Post ablation aggressive programmed stimulation on and off Isuprel with three premature atrial stimuli up to 200 ms were done and did not induce any sustained arrhythmia. No SVT was induced. AH interval was 60 msec with HV interval of 40 msec post ablation. Procedure was challenging and prolonged due to multiple reason including the location of accessory pathway which was close to his location and required very careful mapping and ablation to avoid injury to the AV node.   To map we needed to use intermittent multiple dose of adenosine to block the AV node and have maximal preexcitation. Also initial ablation was done and after waiting period the pathway conduction recurred. This resulted in prolonged and challenging procedure. At the end of the procedure, using ICE we confirmed the lack of any pericardial effusion. EBL less than 20 ml. Conclusion:   Successful ablation of WPW syndrome and orthodromic AVRT via parahisian pathway. PLAN:   The patient will be observed and will be discharged if remains stable. Patient will receive usual post ablation care.

## 2020-11-13 NOTE — ANESTHESIA POSTPROCEDURE EVALUATION
Department of Anesthesiology  Postprocedure Note    Patient: uSsanna Mueller  MRN: [id number]  YOB: 1995  Date of evaluation: 11/13/2020  Time:  11:49 AM     Procedure Summary     Date:  11/13/20 Room / Location:  [facility] Cath Lab; [facility] Echocardiography    Anesthesia Start:  2829 Anesthesia Stop:      Procedure:  ECHO SANTY IN CARDIAC CATH Diagnosis:       Paroxysmal atrial fibrillation      Pre-excitation syndrome      Palpitations    Scheduled Providers:   Responsible Provider:  Erica Brothers MD    Anesthesia Type:  general ASA Status:  2          Anesthesia Type: general    William Phase I:      William Phase II:      Last vitals: Reviewed and per EMR flowsheets.        Anesthesia Post Evaluation    Patient location during evaluation: PACU  Patient participation: complete - patient participated  Level of consciousness: awake and alert  Pain score: 2  Airway patency: patent  Nausea & Vomiting: no vomiting  Complications: no  Cardiovascular status: blood pressure returned to baseline  Respiratory status: acceptable  Hydration status: euvolemic

## 2020-11-14 ENCOUNTER — HOSPITAL ENCOUNTER (EMERGENCY)
Age: 25
Discharge: HOME | End: 2020-11-14
Attending: FAMILY MEDICINE
Payer: COMMERCIAL

## 2020-11-14 VITALS
DIASTOLIC BLOOD PRESSURE: 76 MMHG | RESPIRATION RATE: 16 BRPM | OXYGEN SATURATION: 99 % | TEMPERATURE: 98.4 F | BODY MASS INDEX: 20.22 KG/M2 | HEART RATE: 87 BPM | SYSTOLIC BLOOD PRESSURE: 133 MMHG | WEIGHT: 145 LBS

## 2020-11-14 LAB
BASOPHILS ABSOLUTE: 0 K/UL (ref 0–0.2)
BASOPHILS RELATIVE PERCENT: 0.4 %
EOSINOPHILS ABSOLUTE: 0.1 K/UL (ref 0–0.6)
EOSINOPHILS RELATIVE PERCENT: 0.6 %
HCT VFR BLD CALC: 37.2 % (ref 40.5–52.5)
HEMOGLOBIN: 13.1 G/DL (ref 13.5–17.5)
LYMPHOCYTES ABSOLUTE: 1.8 K/UL (ref 1–5.1)
LYMPHOCYTES RELATIVE PERCENT: 15.9 %
MCH RBC QN AUTO: 31.6 PG (ref 26–34)
MCHC RBC AUTO-ENTMCNC: 35.1 G/DL (ref 31–36)
MCV RBC AUTO: 90 FL (ref 80–100)
MONOCYTES ABSOLUTE: 1.2 K/UL (ref 0–1.3)
MONOCYTES RELATIVE PERCENT: 10.7 %
NEUTROPHILS ABSOLUTE: 8 K/UL (ref 1.7–7.7)
NEUTROPHILS RELATIVE PERCENT: 72.4 %
PDW BLD-RTO: 12.9 % (ref 12.4–15.4)
PLATELET # BLD: 188 K/UL (ref 135–450)
PMV BLD AUTO: 9.6 FL (ref 5–10.5)
RBC # BLD: 4.14 M/UL (ref 4.2–5.9)
WBC # BLD: 11.1 K/UL (ref 4–11)

## 2020-11-14 PROCEDURE — 36415 COLL VENOUS BLD VENIPUNCTURE: CPT

## 2020-11-14 PROCEDURE — 85025 COMPLETE CBC W/AUTO DIFF WBC: CPT

## 2020-11-16 NOTE — ED PROVIDER NOTES
[address]  [address] [id number]  Dept: [phone]  Loc: [phone]    EMERGENCY DEPARTMENT ENCOUNTER      CHIEF COMPLAINT    Chief Complaint   Patient presents with    Post-op Problem     Pt had a cardiac ablation today through the right groin area. SItting at home tinight when he felt warmth on his leg and realized the site was bleeding heavily. Bleeding controlled in triage. HPI    Susanna Mueller is a 26 y.o. male who presents with episode of bleeding from his right calf site. Patient had ablation earlier today. While playing videogames he noticed what he describes as a significant amount of blood pulling on his couch. Direct pressure control this. He presents to the emergency department with no active bleeding. He denies chest pain or palpitations. He denies shortness of breath. He denies presyncope or fatigue. He denies petechiae purpura melena or other bleeding diathesis. He is not on blood thinner. He denies actually any groin pain. He denies any radicular symptoms weakness of his lower extremities or decreased sensation in his right leg. He denies any inguinal swelling.     REVIEW OF SYSTEMS    Skin: No lacerations or puncture wounds  Musculoskeletal: see HPI, no other joint or bony injury or pain  Neurologic: No loss of consciousness, no head injury, no paresthesias or focal distal extremity weakness  I    PAST MEDICAL & SURGICAL HISTORY    Past Medical History:   Diagnosis Date    Pneumonia      Past Surgical History:   Procedure Laterality Date    TYMPANOSTOMY TUBE PLACEMENT         CURRENT MEDICATIONS  (may include discharge medications prescribed in the ED)  Current Outpatient Rx   Medication Sig Dispense Refill    dilTIAZem (CARDIZEM) 60 MG tablet Take 1 tablet by mouth 4 times daily as needed (For episodes of palpitations and fast pulse rate) 30 tablet 1       ALLERGIES    No Known Allergies    SOCIAL & FAMILY HISTORY    Social History     Socioeconomic History    Marital status: Single     Spouse name: None    Number of children: None    Years of education: None    Highest education level: None   Occupational History    None   Social Needs    Financial resource strain: None    Food insecurity     Worry: None     Inability: None    Transportation needs     Medical: None     Non-medical: None   Tobacco Use    Smoking status: Never Smoker    Smokeless tobacco: Never Used   Substance and Sexual Activity    Alcohol use: No    Drug use: No    Sexual activity: None   Lifestyle    Physical activity     Days per week: None     Minutes per session: None    Stress: None   Relationships    Social connections     Talks on phone: None     Gets together: None     Attends Orthodox service: None     Active member of club or organization: None     Attends meetings of clubs or organizations: None     Relationship status: None    Intimate partner violence     Fear of current or ex partner: None     Emotionally abused: None     Physically abused: None     Forced sexual activity: None   Other Topics Concern    None   Social History Narrative    None     Family History   Problem Relation Age of Onset    Diabetes Father     Heart Disease Neg Hx     Hypertension Neg Hx     High Cholesterol Neg Hx          PHYSICAL EXAM    VITAL SIGNS: /76   Pulse 87   Temp 98.4 °F (36.9 °C) (Infrared)   Resp 16   Wt 145 lb (65.8 kg)   SpO2 99%   BMI 20.22 kg/m²   Constitutional:  Well nourished, no acute distress  HENT:  Atraumatic, moist mucous membranes  NECK: normal range of motion,  supple   Respiratory:  No respiratory distress  Cardiovascular:  No JVD  Vascular: bilateral DP pulse 2+, bilateral femoral pulses 2+ and symmetrical.  There is no palpable bruit or audible thrill.   There is minimal bruising around the catheter site  Musculoskeletal: Gait normal.    Integument:  Well hydrated, no

## 2020-11-17 LAB
EKG ATRIAL RATE: 106 BPM
EKG DIAGNOSIS: NORMAL
EKG P AXIS: 67 DEGREES
EKG P-R INTERVAL: 100 MS
EKG Q-T INTERVAL: 334 MS
EKG QRS DURATION: 126 MS
EKG QTC CALCULATION (BAZETT): 443 MS
EKG R AXIS: 64 DEGREES
EKG T AXIS: 256 DEGREES
EKG VENTRICULAR RATE: 106 BPM

## 2020-12-24 ENCOUNTER — APPOINTMENT (OUTPATIENT)
Dept: GENERAL RADIOLOGY | Age: 25
End: 2020-12-24
Payer: COMMERCIAL

## 2020-12-24 ENCOUNTER — HOSPITAL ENCOUNTER (EMERGENCY)
Age: 25
Discharge: HOME | End: 2020-12-25
Attending: EMERGENCY MEDICINE
Payer: COMMERCIAL

## 2020-12-24 PROCEDURE — 12001 RPR S/N/AX/GEN/TRNK 2.5CM/<: CPT

## 2020-12-24 PROCEDURE — 99283 EMERGENCY DEPT VISIT LOW MDM: CPT

## 2020-12-24 PROCEDURE — 73130 X-RAY EXAM OF HAND: CPT

## 2020-12-24 RX ORDER — OXYCODONE HYDROCHLORIDE AND ACETAMINOPHEN 5; 325 MG/1; MG/1
2 TABLET ORAL ONCE
Status: COMPLETED | OUTPATIENT
Start: 2020-12-24 | End: 2020-12-25

## 2020-12-24 ASSESSMENT — PAIN DESCRIPTION - PAIN TYPE: TYPE: ACUTE PAIN

## 2020-12-24 ASSESSMENT — ENCOUNTER SYMPTOMS
GASTROINTESTINAL NEGATIVE: 1
RESPIRATORY NEGATIVE: 1
EYES NEGATIVE: 1

## 2020-12-24 ASSESSMENT — PAIN DESCRIPTION - DESCRIPTORS: DESCRIPTORS: SHARP

## 2020-12-24 ASSESSMENT — PAIN DESCRIPTION - ORIENTATION: ORIENTATION: RIGHT

## 2020-12-24 ASSESSMENT — PAIN SCALES - GENERAL: PAINLEVEL_OUTOF10: 6

## 2020-12-24 ASSESSMENT — PAIN DESCRIPTION - LOCATION: LOCATION: HAND

## 2020-12-25 VITALS
OXYGEN SATURATION: 98 % | HEIGHT: 71 IN | SYSTOLIC BLOOD PRESSURE: 117 MMHG | WEIGHT: 145 LBS | TEMPERATURE: 97.1 F | BODY MASS INDEX: 20.3 KG/M2 | HEART RATE: 88 BPM | DIASTOLIC BLOOD PRESSURE: 70 MMHG | RESPIRATION RATE: 16 BRPM

## 2020-12-25 PROCEDURE — 90715 TDAP VACCINE 7 YRS/> IM: CPT | Performed by: EMERGENCY MEDICINE

## 2020-12-25 PROCEDURE — 6360000002 HC RX W HCPCS: Performed by: EMERGENCY MEDICINE

## 2020-12-25 PROCEDURE — 90471 IMMUNIZATION ADMIN: CPT | Performed by: EMERGENCY MEDICINE

## 2020-12-25 PROCEDURE — 6370000000 HC RX 637 (ALT 250 FOR IP): Performed by: EMERGENCY MEDICINE

## 2020-12-25 RX ADMIN — TETANUS TOXOID, REDUCED DIPHTHERIA TOXOID AND ACELLULAR PERTUSSIS VACCINE, ADSORBED 0.5 ML: 5; 2.5; 8; 8; 2.5 SUSPENSION INTRAMUSCULAR at 00:51

## 2020-12-25 RX ADMIN — OXYCODONE AND ACETAMINOPHEN 2 TABLET: 5; 325 TABLET ORAL at 00:00

## 2020-12-25 ASSESSMENT — PAIN SCALES - GENERAL
PAINLEVEL_OUTOF10: 5
PAINLEVEL_OUTOF10: 7

## 2020-12-25 ASSESSMENT — PAIN DESCRIPTION - PROGRESSION: CLINICAL_PROGRESSION: GRADUALLY IMPROVING

## 2020-12-25 NOTE — ED NOTES
Bed: 17  Expected date:   Expected time:   Means of arrival: Walk In  Comments:     Lucia Anaya RN  12/24/20 [id number]

## 2020-12-25 NOTE — ED PROVIDER NOTES
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 Romana Mota is a 26 y.o. male comes in after smashing his hand under a forklift at work. This happened just prior to arrival of the scribe the pain is 6 out of 10 in its intensity worse with moving it. He has a laceration on the dorsum and volar surface of the hand as well. Moving or touching it seems to make it worse. Nursing Notes were all reviewed and agreed with or any disagreements were addressed  in the HPI. REVIEW OF SYSTEMS    (2-9 systems for level 4, 10 or more for level 5)     Review of Systems   Constitutional: Negative. HENT: Negative. Eyes: Negative. Respiratory: Negative. Cardiovascular: Negative. Gastrointestinal: Negative. Endocrine: Negative. Musculoskeletal:        Right hand pain   Neurological: Negative. PAST MEDICAL HISTORY     Past Medical History:   Diagnosis Date    Pneumonia        SURGICAL HISTORY     Past Surgical History:   Procedure Laterality Date    TYMPANOSTOMY TUBE PLACEMENT         CURRENTMEDICATIONS       Previous Medications    DILTIAZEM (CARDIZEM) 60 MG TABLET    Take 1 tablet by mouth 4 times daily as needed (For episodes of palpitations and fast pulse rate)       ALLERGIES     Patient has no known allergies.     FAMILYHISTORY       Family History   Problem Relation Age of Onset    Diabetes Father     Heart Disease Neg Hx     Hypertension Neg Hx  High Cholesterol Neg Hx         SOCIAL HISTORY       Social History     Socioeconomic History    Marital status: Single     Spouse name: None    Number of children: None    Years of education: None    Highest education level: None   Occupational History    None   Social Needs    Financial resource strain: None    Food insecurity     Worry: None     Inability: None    Transportation needs     Medical: None     Non-medical: None   Tobacco Use    Smoking status: Never Smoker    Smokeless tobacco: Never Used   Substance and Sexual Activity    Alcohol use: Yes     Comment: occasionally    Drug use: No    Sexual activity: None   Lifestyle    Physical activity     Days per week: None     Minutes per session: None    Stress: None   Relationships    Social connections     Talks on phone: None     Gets together: None     Attends Adventism service: None     Active member of club or organization: None     Attends meetings of clubs or organizations: None     Relationship status: None    Intimate partner violence     Fear of current or ex partner: None     Emotionally abused: None     Physically abused: None     Forced sexual activity: None   Other Topics Concern    None   Social History Narrative    None       SCREENINGS             PHYSICAL EXAM    (up to 7 for level 4, 8 or more for level 5)     ED Triage Vitals [12/24/20 2246]   BP Temp Temp src Pulse Resp SpO2 Height Weight   117/70 97.1 °F (36.2 °C) -- 99 20 96 % 5' 11\" (1.803 m) 145 lb (65.8 kg)       Physical Exam  Vitals signs and nursing note reviewed. Constitutional:       Appearance: Normal appearance. HENT:      Head: Normocephalic. Right Ear: External ear normal.      Left Ear: External ear normal.      Nose: Nose normal.      Mouth/Throat:      Mouth: Mucous membranes are moist.      Pharynx: Oropharynx is clear. Eyes:      Extraocular Movements: Extraocular movements intact.       Conjunctiva/sclera: Conjunctivae normal.   Neck: Musculoskeletal: Normal range of motion. Cardiovascular:      Pulses: Normal pulses. Pulmonary:      Effort: Pulmonary effort is normal.   Abdominal:      General: Abdomen is flat. Palpations: Abdomen is soft. Musculoskeletal:      Comments: Right hand show some swelling dorsum of the small laceration on the dorsum between the first and second digit. There is a larger laceration on the volar surface of the hand just opposite the first.  It is unknown whether something went through the hand. Hand is tender as well. Sensation is intact distally   Skin:     General: Skin is warm and dry. Capillary Refill: Capillary refill takes less than 2 seconds. Neurological:      General: No focal deficit present. Mental Status: He is alert. Psychiatric:         Mood and Affect: Mood normal.         Behavior: Behavior normal.         DIAGNOSTIC RESULTS   LABS:    Labs Reviewed - No data to display    All other labs were within normal range or not returned as of this dictation. EKG:       RADIOLOGY:        Interpretation per the Radiologist below, if available at the time of this note:    XR HAND RIGHT (MIN 3 VIEWS)   Final Result   Right hand soft tissue swelling. No acute fracture or dislocation. No results found. PROCEDURES   Unless otherwise noted below, none     Lac Repair    Date/Time: 12/25/2020 12:43 AM  Performed by: Hossein Álvarez MD  Authorized by: Hossein Álvarez MD     Consent:     Consent obtained:  Verbal    Consent given by:  Patient    Risks discussed:  Retained foreign body, pain, infection and poor cosmetic result    Alternatives discussed:  No treatment  Anesthesia (see MAR for exact dosages):      Anesthesia method:  Local infiltration    Local anesthetic:  Lidocaine 1% w/o epi  Laceration details:     Location:  Hand    Hand location:  R hand, dorsum    Length (cm):  1  Pre-procedure details: 1. Laceration of right hand without foreign body, initial encounter          DISPOSITION/PLAN   DISPOSITION Decision To Discharge 12/25/2020 12:45:53 AM      PATIENT REFERREDTO:  [facility]) Pre-Services  [phone]          DISCHARGE MEDICATIONS:  New Prescriptions    No medications on file       DISCONTINUED MEDICATIONS:  Discontinued Medications    No medications on file              (Please note that portions ofthis note were completed with a voice recognition program.  Efforts were made to edit the dictations but occasionally words are mis-transcribed.)    Per Child MD (electronically signed)             Per Child MD  12/25/20 2539

## 2021-01-05 ENCOUNTER — TELEMEDICINE (OUTPATIENT)
Dept: FAMILY MEDICINE CLINIC | Age: 26
End: 2021-01-05
Payer: COMMERCIAL

## 2021-01-05 DIAGNOSIS — I45.6 WPW (WOLFF-PARKINSON-WHITE SYNDROME): Primary | ICD-10-CM

## 2021-01-05 DIAGNOSIS — S61.411A LACERATION OF RIGHT HAND WITHOUT FOREIGN BODY, INITIAL ENCOUNTER: ICD-10-CM

## 2021-01-05 PROCEDURE — 99442 PR PHYS/QHP TELEPHONE EVALUATION 11-20 MIN: CPT | Performed by: FAMILY MEDICINE

## 2021-01-05 ASSESSMENT — PATIENT HEALTH QUESTIONNAIRE - PHQ9
SUM OF ALL RESPONSES TO PHQ QUESTIONS 1-9: 0
2. FEELING DOWN, DEPRESSED OR HOPELESS: 0
SUM OF ALL RESPONSES TO PHQ9 QUESTIONS 1 & 2: 0
SUM OF ALL RESPONSES TO PHQ QUESTIONS 1-9: 0

## 2021-01-05 ASSESSMENT — ENCOUNTER SYMPTOMS
COLOR CHANGE: 0
CHEST TIGHTNESS: 0
SHORTNESS OF BREATH: 0

## 2021-01-05 NOTE — PROGRESS NOTES
Subjective:      Patient ID: Robert Engle is a 26 y.o. male. TO BECOME ESTABLISHED    Robert Engle is a 26 y.o. male evaluated via telephone on 1/5/2021. Consent:  He and/or health care decision maker is aware that that he may receive a bill for this telephone service, depending on his insurance coverage, and has provided verbal consent to proceed: Yes      Documentation:  I communicated with the patient and/or health care decision maker about cc. Details of this discussion including any medical advice provided: yes      I affirm this is a Patient Initiated Episode with a Patient who has not had a related appointment within my department in the past 7 days or scheduled within the next 24 hours. Patient identification was verified at the start of the visit: Yes    Total Time: minutes: 11-20 minutes    Note: not billable if this call serves to triage the patient into an appointment for the relevant concern      Cullen Wallis  This is a new (new patient, hx of wpw, ) problem. The current episode started more than 1 month ago. The problem occurs rarely. The problem has been resolved. Pertinent negatives include no chest pain, chills or fever. Nothing aggravates the symptoms. Treatments tried: s/p ablation, followed by Dr. Cora Hastings. The treatment provided significant relief. Pt had laceration of R hand on Dec 24, per his report no pain, full rom and sensation is intact. Needs stitches removed. No redness, fever, chills. Review of Systems   Constitutional: Negative for activity change, chills and fever. Respiratory: Negative for chest tightness and shortness of breath. Cardiovascular: Negative for chest pain and palpitations. Skin: Positive for wound. Negative for color change. Neurological: Negative for light-headedness. has No Known Allergies. No current outpatient medications on file. has a past medical history of WPW (Yordan-Parkinson-White syndrome).     History reviewed. No pertinent surgical history. reports that he has never smoked. He has never used smokeless tobacco. He reports current alcohol use. He reports that he does not use drugs. family history includes Diabetes in his father; No Known Problems in his mother. Objective: Wt 140  5'10\"     Physical Exam  Vitals signs and nursing note reviewed. Neurological:      Mental Status: He is alert and oriented to person, place, and time. Psychiatric:         Mood and Affect: Mood normal.         Thought Content: Thought content normal.         Assessment:      WPW sx   Hand laceration         Plan:      1. Sx are resolved, continue to fu with cardiology    2. Will go to  for suture removal  Tdap utd.      Fu prn           Jesus Zhao MD

## 2021-01-25 NOTE — PROGRESS NOTES
[address]   Electrophysiology Follow up   Date: 1/25/2021  I had the privilege of visiting Sarina Caban in the office. CC: follow up ablation  HPI: Sarina Caban is a 26 y.o. male history of WPW s/p ablation para-hisian pathway using cryoablation on 05/03/2018 by Dr. Alyce Rogers. Prior to ablation he had recurrent palpitation, and SVT. He also had atrial fibrillation with RVR with pre-excitation and conduction over AP requiring cardioversion prior to his ablation.      On 3/26/2019 in follow up visit, he was found to have recurrence of accessory pathway conduction with preexcitation on his ECG. He later developed atrial fibrillation and was treated with Flecainide. 09/08/2020 presented to ER with palpitations, lightheaded, dizziness and chest pressure. He was found to be in atrial fibrillation with Rapid ventricular rate. He had stopped his flecainide after one month of taking it. IV Procainamide infusion in the ER. There was no pre-excitation on his EKG. [id number] score 0. Holter Monitor 09/11/2020 to 09/16/2020  Predominant rhythm is Sinus Rhythm . Sinus tachycardia 16%, PAC 6% bigeminy, High heart rate 170, Low Heart rate 50, Average heart rate 85 bpm.    On 11/13/2020 he had another EP study. 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 He also had intermittent RBBB prior to ablation. Underwent ablation of pathway successfully with no recurrence. Jules Rodas presents today for follow. Patient denies lightheadedness, dizziness, chest pain, palpitations, orthopnea, edema, presyncope or syncope. He has returned to work at the [employer]. 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 Initial ablation 05/03/2018 by Cryo (Dr. Alyce Rogers)   Recurrence of SVT and also Atrial fibrillation.    11/13/2020 EP study with induction of orthodromic AVRT via septal pacing pathway which was ventricle size, wall thickness, and systolic function with an   estimated ejection fraction of 60%. No regional wall motion abnormalities   are seen. Normal diastolic function. E/e\"=8.5      Mitral Valve   The mitral valve normal in structure and function. No evidence of mitral regurgitation or stenosis. Left Atrium   The left atrium is normal in size. Aortic Valve   The aortic valve appears     I independently reviewed the cardiac diagnostic studies, ECG and relevant imaging studies. Holter Monitor 09/11/2020 to 09/16/2020  Predominant rhythm is Sinus Rhythm . Sinus tachycardia 16%, PAC 6% bigeminy, High heart rate 170, Low Heart rate 50, Average heart rate 85 bpm.      Lab Results   Component Value Date    LVEF 58 11/13/2020    LVEFMODE Echo 03/14/2019     No results found for: TSHFT4, TSH      Physical Examination:  There were no vitals filed for this visit. Wt Readings from Last 3 Encounters:   12/24/20 145 lb (65.8 kg)   11/13/20 145 lb (65.8 kg)   11/13/20 145 lb (65.8 kg)       · Constitutional: Oriented. No distress. · Head: Normocephalic and atraumatic. · Mouth/Throat: Oropharynx is clear and moist.   · Eyes: Conjunctivae normal. EOM are normal.   · Neck: Neck supple. No JVD present. · Cardiovascular: Normal rate, regular rhythm, S1&S2. · Pulmonary/Chest: Bilateral respiratory sounds. No rhonchi. · Abdominal: Soft. No tenderness. · Musculoskeletal: No tenderness. No edema    · Lymphadenopathy: Has no cervical adenopathy. · Neurological: Alert and oriented. Follows command, No Gross deficit   · Skin: Skin is warm, No rash noted. · Psychiatric: Has a normal behavior          Review of System:  [x] Full ROS obtained and negative except as mentioned in HPI    Prior to Admission medications    Not on File       No Known Allergies    Social History:  Reviewed. reports that he has never smoked. He has never used smokeless tobacco. He reports current alcohol use.  He reports that he does not use drugs. Family History:  Reviewed. Reviewed. No family history of SCD. Relevant and available labs, and cardiovascular diagnostics reviewed. Reviewed. I independently reviewed relevant and available cardiac diagnostic tests ECG, CXR, Echo, Stress test, Device interrogation, Holter, CT scan. - The patient is counseled to follow a low salt diet to assure blood pressure remains controlled for cardiovascular risk factor modification.   - The patient is counseled to avoid excess caffeine, and energy drinks as this may exacerbated ectopy and arrhythmia. - The patient is counseled to get regular exercise 3-5 times per week to control cardiovascular risk factors. - The patient is counseled to lose weigt to control cardiovascular risk factors. - The patient is counseled to avoid tobacco use. All questions and concerns were addressed to the patient/family. Alternatives to my treatment were discussed. I have discussed the above stated plan and the patient verbalized understanding and agreed with the plan. NOTE: This report was transcribed using voice recognition software. Every effort was made to ensure accuracy, however, inadvertent computerized transcription errors may be present. Kaya Harrison MD, MPH  [facility]   Office: [phone]  Fax: [phone] attestation: This note was scribed in the presence of Kaya Harrison MD by Laura Kurtz RN  Physician Attestation: I, Dr. Kaya Harrison, confirm that the scribe's documentation has been prepared under my direction and personally reviewed by me in its entirety. I also confirm that the note above accurately reflects all work, treatment, procedures, and medical decision making performed by me.

## 2021-01-26 ENCOUNTER — OFFICE VISIT (OUTPATIENT)
Dept: CARDIOLOGY CLINIC | Age: 26
End: 2021-01-26
Payer: COMMERCIAL

## 2021-01-26 VITALS
BODY MASS INDEX: 23.13 KG/M2 | SYSTOLIC BLOOD PRESSURE: 137 MMHG | WEIGHT: 161.6 LBS | DIASTOLIC BLOOD PRESSURE: 83 MMHG | HEIGHT: 70 IN | HEART RATE: 92 BPM | OXYGEN SATURATION: 98 %

## 2021-01-26 DIAGNOSIS — I47.1 SVT (SUPRAVENTRICULAR TACHYCARDIA) (HCC): Primary | ICD-10-CM

## 2021-01-26 PROCEDURE — 93000 ELECTROCARDIOGRAM COMPLETE: CPT | Performed by: INTERNAL MEDICINE

## 2021-01-26 PROCEDURE — 99213 OFFICE O/P EST LOW 20 MIN: CPT | Performed by: INTERNAL MEDICINE

## 2021-04-15 ENCOUNTER — VIRTUAL VISIT (OUTPATIENT)
Dept: FAMILY MEDICINE CLINIC | Age: 26
End: 2021-04-15
Payer: COMMERCIAL

## 2021-04-15 ENCOUNTER — OFFICE VISIT (OUTPATIENT)
Dept: PRIMARY CARE CLINIC | Age: 26
End: 2021-04-15
Payer: COMMERCIAL

## 2021-04-15 DIAGNOSIS — Z71.89 EDUCATED ABOUT 2019 NOVEL CORONAVIRUS INFECTION: Primary | ICD-10-CM

## 2021-04-15 DIAGNOSIS — J01.90 ACUTE BACTERIAL SINUSITIS: ICD-10-CM

## 2021-04-15 DIAGNOSIS — J06.9 VIRAL URI: ICD-10-CM

## 2021-04-15 DIAGNOSIS — Z20.828 EXPOSURE TO SARS-ASSOCIATED CORONAVIRUS: Primary | ICD-10-CM

## 2021-04-15 DIAGNOSIS — B96.89 ACUTE BACTERIAL SINUSITIS: ICD-10-CM

## 2021-04-15 DIAGNOSIS — U07.1 COVID-19 VIRUS INFECTION: ICD-10-CM

## 2021-04-15 PROCEDURE — 99211 OFF/OP EST MAY X REQ PHY/QHP: CPT | Performed by: NURSE PRACTITIONER

## 2021-04-15 PROCEDURE — 99202 OFFICE O/P NEW SF 15 MIN: CPT | Performed by: FAMILY MEDICINE

## 2021-04-15 RX ORDER — BENZONATATE 100 MG/1
100 CAPSULE ORAL 3 TIMES DAILY PRN
Qty: 15 CAPSULE | Refills: 1 | Status: SHIPPED | OUTPATIENT
Start: 2021-04-15 | End: 2021-12-09 | Stop reason: ALTCHOICE

## 2021-04-15 RX ORDER — AMOXICILLIN AND CLAVULANATE POTASSIUM 875; 125 MG/1; MG/1
1 TABLET, FILM COATED ORAL EVERY 12 HOURS
Qty: 10 TABLET | Refills: 0 | Status: SHIPPED | OUTPATIENT
Start: 2021-04-15 | End: 2021-04-20

## 2021-04-15 ASSESSMENT — ENCOUNTER SYMPTOMS
RHINORRHEA: 1
CONSTIPATION: 0
TROUBLE SWALLOWING: 0
SHORTNESS OF BREATH: 0
COUGH: 1
SINUS PRESSURE: 1
VOMITING: 0
BACK PAIN: 0
BLOOD IN STOOL: 0
CHEST TIGHTNESS: 0
ABDOMINAL DISTENTION: 0
WHEEZING: 0
NAUSEA: 0
DIARRHEA: 0
ABDOMINAL PAIN: 0

## 2021-04-15 NOTE — PROGRESS NOTES
Kalpesh Carpio   23 y.o. male   1995    Virtual visit encounter type:   [x] Doxy. me  [] Telephone w/o video  [] MyChart  [] Other: This is patient's first visit with me. Reasons for visit:  Chief Complaint   Patient presents with    Congestion     Started 1.5 weeks ago.  Cough    Pharyngitis       Kalpesh Carpio has a PMH significant for:     Patient Active Problem List   Diagnosis    Tachycardia    WPW (Yordan-Parkinson-White syndrome)    Atrial fibrillation (HCC)    Palpitation    WPW syndrome    SVT (supraventricular tachycardia) ([facility])       HPI:    Office visit encounter:    URI/LRI infection:  -Duration: 1.5 weeks ao  -Associated signs/symptoms: headache x2 days then sore throat and rhinorrhea along with congestion. Sore throat resolved for several days and reappeared. No dysphagia or odynophagia  -Fever: none  -Current status: worsening/persistent  -Medications used: OTC - mucinex; antibiotics - none  -Seen urgent care/ER/other clinic (prior to today's visit):  -Exposure to sick contacts: Unsure - wife also has same symptoms, which started around same time. She works at [employer].  -Have you visited a nursing home/hospital/healthcare facility recently? No  -Past history of hospitalizations/ER visits (for pneumonia, wheezing/dyspnea, etc.)? No  -Current occupation? Works at [employer]  -Travel history: none  -Other: wife went to [facility] on 4/8/2021 with same symptoms. Had rapid COVID-19, strep test which were both negative. No Known Allergies    No current outpatient medications on file prior to visit. No current facility-administered medications on file prior to visit.          Family History   Problem Relation Age of Onset    No Known Problems Mother     Diabetes Father     Heart Disease Neg Hx     Hypertension Neg Hx     High Cholesterol Neg Hx        Social History     Tobacco Use    Smoking status: Never Smoker    Smokeless tobacco: Never Used Encounters:   01/26/21 137/83   12/24/20 117/70   11/14/20 133/76       Pulse Readings from Last 3 Encounters:   01/26/21 92   12/25/20 88   11/14/20 87       There were no vitals taken for this visit. Physical Exam  Vitals signs reviewed. Constitutional:       General: He is awake. He is not in acute distress. Appearance: Normal appearance. He is not ill-appearing. HENT:      Head: Normocephalic and atraumatic. Right Ear: External ear normal.      Left Ear: External ear normal.      Nose: Nose normal.   Eyes:      General: No scleral icterus. Right eye: No discharge. Left eye: No discharge. Extraocular Movements: Extraocular movements intact. Conjunctiva/sclera: Conjunctivae normal.   Neck:      Musculoskeletal: Normal range of motion. No erythema. Pulmonary:      Effort: Pulmonary effort is normal. No respiratory distress. Breath sounds: No stridor. No wheezing. Abdominal:      General: There is no distension. Musculoskeletal: Normal range of motion. Skin:     Coloration: Skin is not cyanotic, jaundiced or pale. Findings: No erythema. Neurological:      General: No focal deficit present. Mental Status: He is alert and oriented to person, place, and time. Mental status is at baseline. Psychiatric:         Attention and Perception: Attention and perception normal.         Mood and Affect: Mood and affect normal.         Speech: Speech normal.         Behavior: Behavior normal. Behavior is cooperative. Thought Content: Thought content normal.       Assessment/Plan:   Erica Faust was seen today for congestion, cough and pharyngitis. Diagnoses and all orders for this visit:    Educated about 2019 novel coronavirus infection    Acute bacterial sinusitis  -     COVID-19 Ambulatory; Future  -     benzonatate (TESSALON) 100 MG capsule;  Take 1 capsule by mouth 3 times daily as needed for Cough  -     amoxicillin-clavulanate (AUGMENTIN) 875-125 MG per tablet; Take 1 tablet by mouth every 12 hours for 5 days    Viral URI  -     COVID-19 Ambulatory; Future  -     benzonatate (TESSALON) 100 MG capsule; Take 1 capsule by mouth 3 times daily as needed for Cough    COVID-19 virus infection  Comments:  Educational information was provided to the patient regarding the signs and symptoms associated with COVID-19. Patient was also made aware that COVID-19 has a variable presentation and is highly contagious. Patient was informed that COVID-19 is mainly contracted by being in close proximity (via respiratory droplets) to an infected person with COVID-19 and that the virus can be transmitted even if one is asymptomatic. The incubation period is 5-6 days. Patient was also made aware that the virus can be transmitted via oral/body secretions. Patient was advised to wear a mask and gloves especially in large public settings (e.g. grocery store) and maintain social distancing (> 6 feet apart) as much as possible. Patient was informed if one has any symptoms consistent with COVID-19 were to develop or if one has been exposed to a person, who tested positive for COVID-19 to go to one of [facility]'s nearest flu clinics to be evaluated and possibly be tested for COVID-19 (based on the discretion of the healthcare evaluator) or seek care with their PCP (via telemedicine), urgent care, or ER. Results may take 2-5 days (or even longer) depending on how and where the patient was tested. The patient was advised to make arrangements with family and/or friends to obtaining basic necessities (eg groceries) and that it is recommended that no one visit within the household to prevent possible exposure and transmission to others. Based on current CDC guidelines, at least 24 hours of being afebrile as well as improvement of overall symptoms is highly recommended before returning to work and minimum of 7-10 days (or longer) has elapsed since symptoms first started.   Patient was drug formulary of each insurance changes without any warning or notification to the healthcare provider let alone the pharmacy.  -The cost of medications vary from insurance to insurance and the cost is always subject to change just like the drug formulary. Estimated length of time of today's visit: 15 minutes    Follow-up: Return if symptoms worsen or fail to improve. .     Patient was informed that if his or her symptoms worsen to follow up with me sooner or go to the nearest ER if the symptoms are very significant and warrant higher level of care. General disclaimer regarding telemedicine (virtual) visits:  Ju Norton is a 23 y.o. male is being evaluated by a virtual visit (video visit) and/or telephone (without video) encounter to address their concern(s) as mentioned above. Prior to arranging this appointment, the patient was made aware of the need and importance to schedule the visit in this manner given the current ongoing COVID-19 pandemic. The patient was also made aware that the telemedicine service used (e.g.doxy. me) would not save let alone record any information (audio, video, and text) regarding the actual virtual visit. After this discussion, the patient acknowledged understanding and agreed to today's virtual visit encounter. A caregiver was present when appropriate as well as an  if requested. Due to this being a TeleHealth encounter (during the [id number]-63 public health emergency), evaluation of the following organ systems was overall limited: Vitals/Constitutional/EENT/Resp/CV/GI//MS/Neuro/Skin/Heme-Lymph-Imm. As a result, establishing a diagnosis(s) and formulating a plan of care may be overall more difficult and complicated compared to a conventional (face-to-face) office visit.   The patient was made aware about the potential limitations and difficulties of a telemedicine visit such as having technical difficulties related to video and/or audio issues often stemming that require reviewing, etc.) be addressed and completed in a timely fashion (ideally within 72 hours of the patient encounter). It is also worth noting that healthcare providers often see several patients a day (e.g. > 15-30 patients/day) in either the outpatient and/or inpatient setting(s). In addition, healthcare providers spend a significant amount of time reviewing multiple patients' charts in preparation for their future encounters (pre-charting) as well as time spent reviewing any labs, imaging, specialist's notes (if relevant), and other documents (e.g. recent ER visits or hospital stays or completing forms such as FMLA, short-term/long-term disability), etc.  Time is also allocated to attending to patient's messages on [employer], phone calls from various people, attending to prescription refills/orders, and also attending meetings or conferences throughout the day. Time and effort is also allocated to coordinating with office staff to make sure various things are completed as part of the day-to-day office management responsibilities. For the most part, substantial portion of each given day is usually spent with direct patient care followed by documenting. Given all this, it is worth pointing out that not every detail of the encounter can be remembered and not everything discussed is considered relevant, significant, or noteworthy. It is also worth mentioning that my recollection of the visit may differ from the respective patient's recollection of the visit. This note is primarily written for myself (the healthcare provider) utilizing one of my own customized templates so I can recall what happened during that visit and may be used for future reference for myself to prepare for follow up appointments.   My note is also written in mind for other healthcare professionals (who have their own extensive medical background and experience) for their own understanding (of what happened during the visit) and also more importantly to hopefully help influence and play a role in formulating their plan of care along with their own unique medical expertise. If one has any questions or concerns about my given note, please take into consideration all these aforementioned things before contacting. Nikolya Banegas M.D.   [address] [facility]    Electronically signed by Parth Tarango on 4/15/2021 at 8:43 AM.

## 2021-04-15 NOTE — PATIENT INSTRUCTIONS
You have received a viral test for COVID-19. Below is education on quarantine per the CDC guidelines. For any symptoms, seek care from your PCP, call [phone] to establish care with a doctor, or go directly to an urgent care or the emergency room. Test results will take 2-7 days and will be sent to you in your [employer] account. If you test positive, you will be contacted via phone. If you test negative, the ONLY communication will be through [address] GO TO [facility] AND SIGN UP FOR [employer]  (LOWER LEFT OF THE HOME PAGE)  No test is 100%. If you have symptoms, you should follow the guidance of quarantine as previously stated. You can still be contagious if you have symptoms. Your [facility] will reach out to you if you have a positive result. They will provide you with a return to work date and note. If you were tested for a pre-op, then you should remain in quarantine until your procedure. How do I know if I need to be in quarantine? If you live in a community where COVID-19 is or might be spreading (currently, that is virtually everywhere in the [place])  Be alert for symptoms. Watch for fever, cough, shortness of breath, or other symptoms of COVID-19.  Take your temperature if symptoms develop.  Practice social distancing. Maintain 6 feet of distance from others and stay out of crowded places.  Follow CDC guidance if symptoms develop. If you feel healthy but:   Recently had close contact with a person with COVID-19 you need to Quarantine:   Stay home until 14 days after your last exposure.  Check your temperature twice a day and watch for symptoms of COVID-19.  If possible, stay away from people who are at higher-risk for getting very sick from COVID-19.   Stay Home and Monitor Your Health if you:   Have been diagnosed with COVID-19, or   Are waiting for test results, or   Have cough, fever, or shortness of breath, or symptoms of COVID-19      When You Can

## 2021-04-15 NOTE — PROGRESS NOTES
Lionel Carbajal received a viral test for COVID-19. They were educated on isolation and quarantine as appropriate. For any symptoms, they were directed to seek care from their PCP, given contact information to establish with a doctor, directed to an urgent care or the emergency room.

## 2021-04-16 LAB — SARS-COV-2: NOT DETECTED

## 2021-04-19 ENCOUNTER — TELEPHONE (OUTPATIENT)
Dept: FAMILY MEDICINE CLINIC | Age: 26
End: 2021-04-19

## 2021-11-28 ENCOUNTER — HOSPITAL ENCOUNTER (EMERGENCY)
Age: 26
Discharge: HOME | End: 2021-11-28
Payer: COMMERCIAL

## 2021-11-28 VITALS
WEIGHT: 164 LBS | HEART RATE: 90 BPM | BODY MASS INDEX: 22.96 KG/M2 | TEMPERATURE: 98.3 F | RESPIRATION RATE: 18 BRPM | OXYGEN SATURATION: 100 % | HEIGHT: 71 IN | DIASTOLIC BLOOD PRESSURE: 73 MMHG | SYSTOLIC BLOOD PRESSURE: 131 MMHG

## 2021-11-28 DIAGNOSIS — S61.211A LACERATION OF LEFT INDEX FINGER WITHOUT FOREIGN BODY WITHOUT DAMAGE TO NAIL, INITIAL ENCOUNTER: Primary | ICD-10-CM

## 2021-11-28 PROCEDURE — 2500000003 HC RX 250 WO HCPCS: Performed by: PHYSICIAN ASSISTANT

## 2021-11-28 PROCEDURE — 12001 RPR S/N/AX/GEN/TRNK 2.5CM/<: CPT

## 2021-11-28 PROCEDURE — 99283 EMERGENCY DEPT VISIT LOW MDM: CPT

## 2021-11-28 RX ORDER — BUPIVACAINE HYDROCHLORIDE 5 MG/ML
30 INJECTION, SOLUTION EPIDURAL; INTRACAUDAL ONCE
Status: COMPLETED | OUTPATIENT
Start: 2021-11-28 | End: 2021-11-28

## 2021-11-28 RX ADMIN — BUPIVACAINE HYDROCHLORIDE 150 MG: 5 INJECTION, SOLUTION EPIDURAL; INTRACAUDAL at 20:00

## 2021-11-28 ASSESSMENT — ENCOUNTER SYMPTOMS: COLOR CHANGE: 0

## 2021-11-28 ASSESSMENT — PAIN SCALES - GENERAL: PAINLEVEL_OUTOF10: 5

## 2021-11-29 NOTE — ED PROVIDER NOTES
[facility]        Pt Name: Markell Gallegos  MRN: [id number]  [place] 1995  Date of evaluation: 11/28/2021  Provider: Rebecca Lawrence PA-C  [facility]P: [facility] (Inactive)  Note Started: 7:43 PM EST       ANETTE. I have evaluated this patient. My supervising physician was available for consultation. CHIEF COMPLAINT       Chief Complaint   Patient presents with    Laceration     Pt states he was cutting with a knife at around 5pm and cut the tip of his pointer finger. Bledding controlled. Went to urgent crae and dtates that he needed to come here for stitches. Tdap up to date       HISTORY OF PRESENT ILLNESS   (Location, Timing/Onset, Context/Setting, Quality, Duration, Modifying Factors, Severity, Associated Signs and Symptoms)  Note limiting factors. Chief Complaint: Laceration to left index finger    Markell Gallegos is a 23 y.o. male who presents to the emergency department complaining of left index finger laceration status post accidental injury which occurred this evening. Patient states that he accidentally cut his left index finger with a kitchen knife. It was a clean knife. He believes his tetanus to be up-to-date. He rates his pain to be 5 out of 10 on pain scale without radiation of symptoms, numbness, tingling, weakness or likelihood of foreign body. Nursing Notes were all reviewed and agreed with or any disagreements were addressed in the HPI. REVIEW OF SYSTEMS    (2-9 systems for level 4, 10 or more for level 5)     Review of Systems   Constitutional: Negative for chills and fever. Musculoskeletal: Negative for joint swelling. Skin: Positive for wound. Negative for color change, pallor and rash. Neurological: Negative for dizziness, weakness, light-headedness and numbness. All other systems reviewed and are negative. Positives and Pertinent negatives as per HPI.  Except as noted above in the ROS, all other systems were reviewed and negative. PAST MEDICAL HISTORY     Past Medical History:   Diagnosis Date    WPW (Yordan-Parkinson-White syndrome)     s/p ablation on Nov 2019 followed by cardiology         SURGICAL HISTORY     Past Surgical History:   Procedure Laterality Date    ATRIAL ABLATION SURGERY           CURRENTMEDICATIONS       Previous Medications    BENZONATATE (TESSALON) 100 MG CAPSULE    Take 1 capsule by mouth 3 times daily as needed for Cough         ALLERGIES     Patient has no known allergies. FAMILYHISTORY       Family History   Problem Relation Age of Onset    No Known Problems Mother     Diabetes Father     Heart Disease Neg Hx     Hypertension Neg Hx     High Cholesterol Neg Hx           SOCIAL HISTORY       Social History     Tobacco Use    Smoking status: Never Smoker    Smokeless tobacco: Never Used   Vaping Use    Vaping Use: Never used   Substance Use Topics    Alcohol use: Yes     Comment: occasionally    Drug use: No       SCREENINGS             PHYSICAL EXAM    (up to 7 for level 4, 8 or more for level 5)     ED Triage Vitals [11/28/21 1923]   BP Temp Temp Source Pulse Resp SpO2 Height Weight   131/73 98.3 °F (36.8 °C) Oral 90 18 100 % 5' 11\" (1.803 m) 164 lb (74.4 kg)       Physical Exam  Vitals and nursing note reviewed. Constitutional:       Appearance: Normal appearance. He is well-developed. He is not toxic-appearing or diaphoretic. HENT:      Head: Normocephalic and atraumatic. Right Ear: External ear normal.      Left Ear: External ear normal.      Nose: Nose normal.   Eyes:      General:         Right eye: No discharge. Left eye: No discharge. Cardiovascular:      Rate and Rhythm: Normal rate. Pulses:           Radial pulses are 2+ on the right side and 2+ on the left side. Pulmonary:      Effort: Pulmonary effort is normal.      Breath sounds: Normal breath sounds.    Musculoskeletal:         General: Normal range of motion. Right forearm: Normal.      Left forearm: Normal.      Right wrist: Normal.      Left wrist: Normal.      Right hand: Normal.      Left hand: Laceration and tenderness present. No swelling, deformity or bony tenderness. Normal range of motion. Normal strength. Normal sensation. There is no disruption of two-point discrimination. Normal capillary refill. Normal pulse. Hands:       Cervical back: Normal range of motion. Skin:     General: Skin is warm and dry. Capillary Refill: Capillary refill takes less than 2 seconds. Coloration: Skin is not jaundiced or pale. Findings: No bruising, erythema, lesion or rash. Neurological:      Mental Status: He is alert and oriented to person, place, and time. Sensory: No sensory deficit. Motor: No weakness. Deep Tendon Reflexes: Reflexes normal.   Psychiatric:         Behavior: Behavior normal.         DIAGNOSTIC RESULTS   LABS:    Labs Reviewed - No data to display    When ordered only abnormal lab results are displayed. All other labs were within normal range or not returned as of this dictation. EKG: When ordered, EKG's are interpreted by the Emergency Department Physician in the absence of a cardiologist.  Please see their note for interpretation of EKG. RADIOLOGY:   Non-plain film images such as CT, Ultrasound and MRI are read by the radiologist. Plain radiographic images are visualized and preliminarily interpreted by the ED Provider with the below findings:        Interpretation per the Radiologist below, if available at the time of this note:    No orders to display     No results found.         PROCEDURES   Unless otherwise noted below, none     Lac Repair    Date/Time: 11/28/2021 7:50 PM  Performed by: Roberto Howard PA-C  Authorized by: Roberto Howard PA-C     Consent:     Consent obtained:  Verbal    Consent given by:  Patient    Risks discussed:  Pain  Anesthesia (see MAR for exact dosages): Anesthesia method:  Nerve block    Block location:  Digital nerve    Block needle gauge:  27 G    Block anesthetic:  Bupivacaine 0.5% w/o epi    Block injection procedure:  Anatomic landmarks identified, anatomic landmarks palpated, introduced needle, negative aspiration for blood and incremental injection    Block outcome:  Anesthesia achieved  Laceration details:     Location:  Finger    Finger location:  L index finger    Length (cm):  2    Depth (mm):  2  Repair type:     Repair type:  Simple  Pre-procedure details:     Preparation:  Patient was prepped and draped in usual sterile fashion  Exploration:     Hemostasis achieved with:  Direct pressure    Wound exploration: wound explored through full range of motion and entire depth of wound probed and visualized      Wound extent: no foreign bodies/material noted, no muscle damage noted, no nerve damage noted, no tendon damage noted, no underlying fracture noted and no vascular damage noted      Contaminated: no    Treatment:     Area cleansed with:  Hibiclens and saline    Amount of cleaning:  Extensive    Irrigation solution:  Sterile saline    Irrigation volume:  500 cc    Irrigation method:  Tap  Skin repair:     Repair method:  Sutures    Suture size:  5-0    Suture material:  Nylon    Suture technique:  Simple interrupted    Number of sutures:  4  Approximation:     Approximation:  Close  Post-procedure details:     Dressing:  Non-adherent dressing and sterile dressing    Patient tolerance of procedure:   Tolerated well, no immediate complications        CRITICAL CARE TIME   N/A    CONSULTS:  None      EMERGENCY DEPARTMENT COURSE and DIFFERENTIAL DIAGNOSIS/MDM:   Vitals:    Vitals:    11/28/21 1923   BP: 131/73   Pulse: 90   Resp: 18   Temp: 98.3 °F (36.8 °C)   TempSrc: Oral   SpO2: 100%   Weight: 164 lb (74.4 kg)   Height: 5' 11\" (1.803 m)       Patient was given the following medications:  Medications   bupivacaine (PF) (MARCAINE) 0.5 % injection 150 mg (has no administration in time range)           This patient presents with laceration to left index finger. Motor and sensory function are preserved. He tolerated laceration repair well without immediate complications or emesis. He was given wound care instructions. Advised to follow-up with PCP for recheck and may return ED per discharge instructions. Vital stable here, and patient is stable for discharge. He is neurovascular intact. My suspicion is low for subungual hematoma, paronychia, eponychia, felon, flexor tenosynovitis,  ACS, PE, thoracic aortic dissection, thoracic outlet obstruction, SVC syndrome, foreign body, tendon rupture, compartment syndrome, acute fracture, dislocation, DVT, arterial compromise or occlusion, limb ischemia, gout, septic joint, abscess, cellulitis, osteomyelitis, or other concerning pathology. FINAL IMPRESSION      1.  Laceration of left index finger without foreign body without damage to nail, initial encounter          DISPOSITION/PLAN   DISPOSITION        PATIENT REFERRED TO:  follow up with your PCP in 1-3 days          [facility] Emergency Department  [address] [place] [address]  [phone]    If symptoms worsen      DISCHARGE MEDICATIONS:  New Prescriptions    No medications on file       DISCONTINUED MEDICATIONS:  Discontinued Medications    No medications on file              (Please note that portions of this note were completed with a voice recognition program.  Efforts were made to edit the dictations but occasionally words are mis-transcribed.)    Renny Banegas PA-C (electronically signed)           Renny Banegas PA-C  11/28/21 2122

## 2021-12-02 ENCOUNTER — OFFICE VISIT (OUTPATIENT)
Dept: FAMILY MEDICINE CLINIC | Age: 26
End: 2021-12-02
Payer: COMMERCIAL

## 2021-12-02 VITALS
TEMPERATURE: 98.3 F | DIASTOLIC BLOOD PRESSURE: 70 MMHG | BODY MASS INDEX: 23.04 KG/M2 | SYSTOLIC BLOOD PRESSURE: 120 MMHG | WEIGHT: 164.6 LBS | HEIGHT: 71 IN | OXYGEN SATURATION: 97 % | HEART RATE: 95 BPM | RESPIRATION RATE: 17 BRPM

## 2021-12-02 DIAGNOSIS — Z76.89 ENCOUNTER TO ESTABLISH CARE: ICD-10-CM

## 2021-12-02 DIAGNOSIS — S61.411A LACERATION OF RIGHT HAND WITHOUT FOREIGN BODY, INITIAL ENCOUNTER: ICD-10-CM

## 2021-12-02 DIAGNOSIS — Z00.00 ANNUAL PHYSICAL EXAM: Primary | ICD-10-CM

## 2021-12-02 DIAGNOSIS — I45.6 WPW (WOLFF-PARKINSON-WHITE SYNDROME): ICD-10-CM

## 2021-12-02 PROCEDURE — 99385 PREV VISIT NEW AGE 18-39: CPT | Performed by: CLINICAL NURSE SPECIALIST

## 2021-12-02 SDOH — ECONOMIC STABILITY: FOOD INSECURITY: WITHIN THE PAST 12 MONTHS, YOU WORRIED THAT YOUR FOOD WOULD RUN OUT BEFORE YOU GOT MONEY TO BUY MORE.: NEVER TRUE

## 2021-12-02 SDOH — ECONOMIC STABILITY: FOOD INSECURITY: WITHIN THE PAST 12 MONTHS, THE FOOD YOU BOUGHT JUST DIDN'T LAST AND YOU DIDN'T HAVE MONEY TO GET MORE.: NEVER TRUE

## 2021-12-02 ASSESSMENT — ENCOUNTER SYMPTOMS
RHINORRHEA: 0
NAUSEA: 0
DIARRHEA: 0
BACK PAIN: 0
CHEST TIGHTNESS: 0
COUGH: 0
CONSTIPATION: 0
SHORTNESS OF BREATH: 0
ABDOMINAL PAIN: 0
COLOR CHANGE: 0
VOMITING: 0
WHEEZING: 0

## 2021-12-02 ASSESSMENT — SOCIAL DETERMINANTS OF HEALTH (SDOH): HOW HARD IS IT FOR YOU TO PAY FOR THE VERY BASICS LIKE FOOD, HOUSING, MEDICAL CARE, AND HEATING?: NOT HARD AT ALL

## 2021-12-02 NOTE — PROGRESS NOTES
SUBJECTIVE:    Patient ID:  Carlos Cole is a 23 y.o. male      Patient is here for a complete physical.  He has no questions or concerns regarding their health. He was seen in the ED for a left index finger on 11/28/21. States he was trying to un-jam a paper shredder and cut his finger with with a knife. Patient's allergies, medication, medical, surgical, family and social history were reviewed and updated accordingly. 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       Past Medical History:   Diagnosis Date    WPW (Yordan-Parkinson-White syndrome)     s/p ablation on Nov 2019 followed by cardiology     Past Surgical History:   Procedure Laterality Date    ATRIAL ABLATION SURGERY       Family History   Problem Relation Age of Onset    No Known Problems Mother     Diabetes Father     Heart Disease Neg Hx     Hypertension Neg Hx     High Cholesterol Neg Hx      Social History     Socioeconomic History    Marital status: Single     Spouse name: Not on file    Number of children: Not on file    Years of education: Not on file    Highest education level: Not on file   Occupational History    Not on file   Tobacco Use    Smoking status: Never Smoker    Smokeless tobacco: Never Used   Vaping Use    Vaping Use: Never used   Substance and Sexual Activity    Alcohol use: Yes     Comment: occasionally/social    Drug use: No    Sexual activity: Yes Partners: Female   Other Topics Concern    Not on file   Social History Narrative    Not on file     Social Determinants of Health     Financial Resource Strain: Low Risk     Difficulty of Paying Living Expenses: Not hard at all   Food Insecurity: No Food Insecurity    Worried About Running Out of Food in the Last Year: Never true    [address] in the Last Year: Never true   Transportation Needs:     Lack of Transportation (Medical): Not on file    Lack of Transportation (Non-Medical): Not on file   Physical Activity:     Days of Exercise per Week: Not on file    Minutes of Exercise per Session: Not on file   Stress:     Feeling of Stress : Not on file   Social Connections:     Frequency of Communication with Friends and Family: Not on file    Frequency of Social Gatherings with Friends and Family: Not on file    Attends [employer] Services: Not on file    Active Member of [address] [place] [address] or Organizations: Not on file    Attends Club or Organization Meetings: Not on file    Marital Status: Not on file   Intimate Partner Violence:     Fear of Current or Ex-Partner: Not on file    Emotionally Abused: Not on file    Physically Abused: Not on file    Sexually Abused: Not on file   Housing Stability:     Unable to Pay for Housing in the Last Year: Not on file    Number of [place] in the Last Year: Not on file    Unstable Housing in the Last Year: Not on file       Review of Systems   Constitutional: Negative for appetite change, chills, fever and unexpected weight change. HENT: Negative for congestion, dental problem, postnasal drip and rhinorrhea. Regular dental exams   Eyes: Negative for visual disturbance. Regular eye exams   Respiratory: Negative for cough, chest tightness, shortness of breath and wheezing. Cardiovascular: Negative for chest pain, palpitations and leg swelling. Gastrointestinal: Negative for abdominal pain, constipation, diarrhea, nausea and vomiting. Endocrine: Negative for cold intolerance, heat intolerance, polydipsia, polyphagia and polyuria. Genitourinary: Negative for dysuria, frequency, scrotal swelling, testicular pain and urgency. Musculoskeletal: Negative for arthralgias, back pain and myalgias. Skin: Negative for color change and rash. Allergic/Immunologic: Negative for environmental allergies. Neurological: Negative for headaches. Hematological: Does not bruise/bleed easily. Psychiatric/Behavioral: Negative for dysphoric mood and sleep disturbance. The patient is not nervous/anxious. OBJECTIVE:    Physical Exam  Vitals and nursing note reviewed. Constitutional:       General: He is not in acute distress. Appearance: Normal appearance. He is well-developed. He is not ill-appearing or diaphoretic. HENT:      Head: Normocephalic and atraumatic. Right Ear: Tympanic membrane, ear canal and external ear normal.      Left Ear: Tympanic membrane, ear canal and external ear normal.      Nose: Nose normal.      Mouth/Throat:      Pharynx: No oropharyngeal exudate. Eyes:      General: Lids are normal.      Conjunctiva/sclera: Conjunctivae normal.      Pupils: Pupils are equal, round, and reactive to light. Neck:      Thyroid: No thyromegaly. Vascular: No JVD. Trachea: No tracheal deviation. Cardiovascular:      Rate and Rhythm: Normal rate and regular rhythm. Heart sounds: Normal heart sounds. No murmur heard. No friction rub. Pulmonary:      Effort: Pulmonary effort is normal. No respiratory distress. Breath sounds: Normal breath sounds. No wheezing or rales. Chest:      Chest wall: No tenderness. Abdominal:      General: Bowel sounds are normal. There is no distension. Palpations: Abdomen is soft. There is no mass. Tenderness: There is no abdominal tenderness. There is no guarding or rebound. Hernia: No hernia is present.    Musculoskeletal:         General: Normal range of motion. Cervical back: Normal range of motion and neck supple. Lymphadenopathy:      Cervical: No cervical adenopathy. Skin:     General: Skin is warm and dry. Findings: No rash. Neurological:      Mental Status: He is alert and oriented to person, place, and time. Cranial Nerves: No cranial nerve deficit. Deep Tendon Reflexes: Reflexes are normal and symmetric. Psychiatric:         Behavior: Behavior normal.       /70 (Site: Right Upper Arm, Position: Sitting, Cuff Size: Medium Adult)   Pulse 95   Temp 98.3 °F (36.8 °C) (Oral)   Resp 17   Ht 5' 11\" (1.803 m)   Wt 164 lb 9.6 oz (74.7 kg)   SpO2 97%   BMI 22.96 kg/m²    BP Readings from Last 3 Encounters:   12/02/21 120/70   11/28/21 131/73   01/26/21 137/83      Wt Readings from Last 3 Encounters:   12/02/21 164 lb 9.6 oz (74.7 kg)   11/28/21 164 lb (74.4 kg)   01/26/21 161 lb 9.6 oz (73.3 kg)       ASSESSMENT & PLAN:    1. Annual physical exam  - CBC Auto Differential; Future  - Comprehensive Metabolic Panel; Future  - Lipid Panel; Future  - TSH with Reflex; Future  - POCT Urinalysis no Micro (with in normal limits)     2. WPW (Yordan-Parkinson-White syndrome)  - Stable, continue current regimen    3. Laceration of right hand without foreign body, initial encounter  - Stitches intact, without signs and symptoms of infection    4. Encounter to establish care  - Sign appropriate paperwork to have records sent to the office      Continue current treatment plan. Current Outpatient Medications   Medication Sig Dispense Refill    benzonatate (TESSALON) 100 MG capsule Take 1 capsule by mouth 3 times daily as needed for Cough (Patient not taking: Reported on 12/2/2021) 15 capsule 1     No current facility-administered medications for this visit. Return if symptoms worsen or fail to improve, for annual physical, WPW, laceration, establish care.     Rebecca Covington received counseling on the following healthy behaviors: nutrition, exercise and medication adherence    Patient given educational materials on health maintenance    Discussed use, benefit, and side effects of prescribed medications. Barriers to medication compliance addressed. All patient questions answered. Pt voiced understanding. Call office if experience side effects from medications. Please note that some or all of this record was generated using voice recognition software. If there are any questions about the content of this document, please contact the author as some errors in transcription may have occurred.

## 2021-12-02 NOTE — PATIENT INSTRUCTIONS
Sign appropriate paper work to have recorders to the office    Discussed health maintenance, information given    Fasting labs ordered    Schedule an appointment next week suture removal    [facility] Locations - No appointment necessary. Sites open Monday to Friday. @ indicates the location is open Saturdays. Call your preferred location for test preparation, business hours and other information you need. [employer] accepts [employer]. [facility]    @ [facility] [address] [place], [address]   Ph: [phone] [facility] [address]   Ph: [phone]  @ [facility] [address] [facility]   Ph: [phone]    [facility] 409 [facility] [place] [address]   Ph: [phone] Lab Svcs[address]  Ph: [phone] [facility] Svcs. [address] [facility] [address] [place] [address]   Ph: [phone]      [facility] [address] [facility] [place], [address] [facility]   Ph: [phone] [address] [facility] [address] [place] [address]   Ph: [phone] [facility] 287 [facility] [place], [address]   Ph: [phone] [address] [facility]. Lab Svcs. 211 [facility], [facility] [facility]   Ph: [address] Lab Svcs[address] [place], [place] [address]   Ph: [phone] [facility]. Lab Svcs. 54 [facility]   Ph: [phone]             Patient Education        Well Visit, Ages 25 to 48: Care Instructions  Overview     Well visits can help you stay healthy. Your doctor has checked your overall health and may have suggested ways to take good care of yourself. Your doctor also may have recommended tests. At home, you can help prevent illness with healthy eating, regular exercise, and other steps.   Follow-up care is a key part of your treatment and safety. Be sure to make and go to all appointments, and call your doctor if you are having problems. It's also a good idea to know your test results and keep a list of the medicines you take. How can you care for yourself at home? · Get screening tests that you and your doctor decide on. Screening helps find diseases before any symptoms appear. · Eat healthy foods. Choose fruits, vegetables, whole grains, protein, and low-fat dairy foods. Limit fat, especially saturated fat. Reduce salt in your diet. · Limit alcohol. If you are a man, have no more than 2 drinks a day or 14 drinks a week. If you are a woman, have no more than 1 drink a day or 7 drinks a week. · Get at least 30 minutes of physical activity on most days of the week. Walking is a good choice. You also may want to do other activities, such as running, swimming, cycling, or playing tennis or team sports. Discuss any changes in your exercise program with your doctor. · Reach and stay at a healthy weight. This will lower your risk for many problems, such as obesity, diabetes, heart disease, and high blood pressure. · Do not smoke or allow others to smoke around you. If you need help quitting, talk to your doctor about stop-smoking programs and medicines. These can increase your chances of quitting for good. · Care for your mental health. It is easy to get weighed down by worry and stress. Learn strategies to manage stress, like deep breathing and mindfulness, and stay connected with your family and community. If you find you often feel sad or hopeless, talk with your doctor. Treatment can help. · Talk to your doctor about whether you have any risk factors for sexually transmitted infections (STIs). You can help prevent STIs if you wait to have sex with a new partner (or partners) until you've each been tested for STIs.  It also helps if you use condoms (male or female condoms) and if you limit your sex partners to one person who only has sex with you. Vaccines are available for some STIs, such as HPV. · Use birth control if it's important to you to prevent pregnancy. Talk with your doctor about the choices available and what might be best for you. · If you think you may have a problem with alcohol or drug use, talk to your doctor. This includes prescription medicines (such as amphetamines and opioids) and illegal drugs (such as cocaine and methamphetamine). Your doctor can help you figure out what type of treatment is best for you. · Protect your skin from too much sun. When you're outdoors from 10 a.m. to 4 p.m., stay in the shade or cover up with clothing and a hat with a wide brim. Wear sunglasses that block UV rays. Even when it's cloudy, put broad-spectrum sunscreen (SPF 30 or higher) on any exposed skin. · See a dentist one or two times a year for checkups and to have your teeth cleaned. · Wear a seat belt in the car. When should you call for help? Watch closely for changes in your health, and be sure to contact your doctor if you have any problems or symptoms that concern you. Where can you learn more? Go to https://O&P PropeZadspaceeweb.healthSurveySnap. org and sign in to your [employer] account. Enter P072 in the [facility] box to learn more about \"Well Visit, Ages 25 to 48: Care Instructions. \"     If you do not have an account, please click on the \"Sign Up Now\" link. Current as of: February 11, 2021               Content Version: 13.0  © 2006-2021 Healthwise, Incorporated. Care instructions adapted under license by [facility] ([facility]). If you have questions about a medical condition or this instruction, always ask your healthcare professional. [place] [address] any warranty or liability for your use of this information. Patient Education        Cuts Closed With Stitches: Care Instructions  Your Care Instructions  A cut can happen anywhere on your body. The doctor used stitches to close the cut.  Using stitches also helps the cut heal and reduces scarring. Sometimes pieces of tape called Steri-Strips are put over the stitches. If the cut went deep and through the skin, the doctor may have put in two layers of stitches. The deeper layer brings the deep part of the cut together. These stitches will dissolve and don't need to be removed. The stitches in the upper layer are the ones you see on the cut. You will probably have a bandage over the stitches. You will need to have the stitches removed, usually in 7 to 14 days. The doctor has checked you carefully, but problems can develop later. If you notice any problems or new symptoms, get medical treatment right away. Follow-up care is a key part of your treatment and safety. Be sure to make and go to all appointments, and call your doctor if you are having problems. It's also a good idea to know your test results and keep a list of the medicines you take. How can you care for yourself at home? · Keep the cut dry for the first 24 to 48 hours. After this, you can shower if your doctor okays it. Pat the cut dry. · Don't soak the cut, such as in a bathtub. Your doctor will tell you when it's safe to get the cut wet. · If your doctor told you how to care for your cut, follow your doctor's instructions. If you did not get instructions, follow this general advice:  ? After the first 24 to 48 hours, wash around the cut with clean water 2 times a day. Don't use hydrogen peroxide or alcohol, which can slow healing. ? You may cover the cut with a thin layer of petroleum jelly, such as Vaseline, and a nonstick bandage. ? Apply more petroleum jelly and replace the bandage as needed. · Prop up the sore area on a pillow anytime you sit or lie down during the next 3 days. Try to keep it above the level of your heart. This will help reduce swelling. · Avoid any activity that could cause your cut to reopen. · Do not remove the stitches on your own.  Your doctor will tell you when to come back to have the stitches removed. · Leave Steri-Strips on until they fall off. · Be safe with medicines. Read and follow all instructions on the label. ? If the doctor gave you a prescription medicine for pain, take it as prescribed. ? If you are not taking a prescription pain medicine, ask your doctor if you can take an over-the-counter medicine. When should you call for help? Call your doctor now or seek immediate medical care if:    · You have new pain, or your pain gets worse.     · The skin near the cut is cold or pale or changes color.     · You have tingling, weakness, or numbness near the cut.     · The cut starts to bleed, and blood soaks through the bandage. Oozing small amounts of blood is normal.     · You have trouble moving the area near the cut.     · You have symptoms of infection, such as:  ? Increased pain, swelling, warmth, or redness around the cut.  ? Red streaks leading from the cut.  ? Pus draining from the cut.  ? A fever. Watch closely for changes in your health, and be sure to contact your doctor if:    · The cut reopens.     · You do not get better as expected. Where can you learn more? Go to https://GPal.ShopEat. org and sign in to your [employer] account. Enter R217 in the [facility] box to learn more about \"Cuts Closed With Stitches: Care Instructions. \"     If you do not have an account, please click on the \"Sign Up Now\" link. Current as of: July 1, 2021               Content Version: 13.0  © 2006-2021 Healthwise, Incorporated. Care instructions adapted under license by [facility] ([facility]). If you have questions about a medical condition or this instruction, always ask your healthcare professional. [place] [address] any warranty or liability for your use of this information.

## 2021-12-07 ENCOUNTER — TELEPHONE (OUTPATIENT)
Dept: FAMILY MEDICINE CLINIC | Age: 26
End: 2021-12-07

## 2021-12-07 NOTE — TELEPHONE ENCOUNTER
----- Message from Rosy Moses sent at 12/7/2021 12:59 PM EST -----  Subject: Message to Provider    QUESTIONS  Information for Provider? Patient is calling to schedule an appointment to   get sutures removed from his L hand pointer finger. He had 4 sutures   placed on 11/28. He is suppose to have those removed in 10-14 days, please   call patient to schedule.   ---------------------------------------------------------------------------  --------------  CALL BACK INFO  What is the best way for the office to contact you? OK to leave message on   voicemail  Preferred Call Back Phone Number? [phone]  ---------------------------------------------------------------------------  --------------  SCRIPT ANSWERS  Relationship to Patient? Self  (Is the patient requesting to see the provider for a procedure?)?  Yes

## 2021-12-09 ENCOUNTER — OFFICE VISIT (OUTPATIENT)
Dept: FAMILY MEDICINE CLINIC | Age: 26
End: 2021-12-09
Payer: COMMERCIAL

## 2021-12-09 VITALS
BODY MASS INDEX: 23.24 KG/M2 | HEART RATE: 87 BPM | WEIGHT: 166 LBS | SYSTOLIC BLOOD PRESSURE: 120 MMHG | HEIGHT: 71 IN | OXYGEN SATURATION: 99 % | DIASTOLIC BLOOD PRESSURE: 68 MMHG

## 2021-12-09 DIAGNOSIS — Z48.02 VISIT FOR SUTURE REMOVAL: ICD-10-CM

## 2021-12-09 DIAGNOSIS — S61.411A LACERATION OF RIGHT HAND WITHOUT FOREIGN BODY, INITIAL ENCOUNTER: Primary | ICD-10-CM

## 2021-12-09 PROCEDURE — 99213 OFFICE O/P EST LOW 20 MIN: CPT | Performed by: CLINICAL NURSE SPECIALIST

## 2021-12-09 NOTE — PATIENT INSTRUCTIONS
Watch for signs and symptoms infection (fever, increased pain, redness, streaking or purulent drainage)    Keep Steri-Strips as dry as possible do not pull them off let them fall off (can trim)   Patient Education        Cuts: Care Instructions  Your Care Instructions  A cut can happen anywhere on your body. Stitches, staples, skin adhesives, or pieces of tape called Steri-Strips are sometimes used to keep the edges of a cut together and help it heal. Steri-Strips can be used by themselves or with stitches or staples. Sometimes cuts are left open. If the cut went deep and through the skin, the doctor may have closed the cut in two layers. A deeper layer of stitches brings the deep part of the cut together. These stitches will dissolve and don't need to be removed. The upper layer closure, which could be stitches, staples, Steri-Strips, or adhesive, is what you see on the cut. A cut is often covered by a bandage. The doctor has checked you carefully, but problems can develop later. If you notice any problems or new symptoms, get medical treatment right away. Follow-up care is a key part of your treatment and safety. Be sure to make and go to all appointments, and call your doctor if you are having problems. It's also a good idea to know your test results and keep a list of the medicines you take. How can you care for yourself at home? If a cut is open or closed  · Prop up the sore area on a pillow anytime you sit or lie down during the next 3 days. Try to keep it above the level of your heart. This will help reduce swelling. · Keep the cut dry for the first 24 to 48 hours. After this, you can shower if your doctor okays it. Pat the cut dry. · Don't soak the cut, such as in a bathtub. Your doctor will tell you when it's safe to get the cut wet. · After the first 24 to 48 hours, clean the cut with soap and water 2 times a day unless your doctor gives you different instructions.   ? Don't use hydrogen peroxide or alcohol, which can slow healing. ? You may cover the cut with a thin layer of petroleum jelly and a nonstick bandage. ? If the doctor put a bandage over the cut, put on a new bandage after cleaning the cut or if the bandage gets wet or dirty. · Avoid any activity that could cause your cut to reopen. · Be safe with medicines. Read and follow all instructions on the label. ? If the doctor gave you a prescription medicine for pain, take it as prescribed. ? If you are not taking a prescription pain medicine, ask your doctor if you can take an over-the-counter medicine. If the cut is closed with stitches, staples, or Steri-Strips  · Follow the above instructions for open or closed cuts. · Do not remove the stitches or staples on your own. Your doctor will tell you when to come back to have the stitches or staples removed. · Leave Steri-Strips on until they fall off. If the cut is closed with a skin adhesive  · Follow the above instructions for open or closed cuts. · Leave the skin adhesive on your skin until it falls off on its own. This may take 5 to 10 days. · Do not scratch, rub, or pick at the adhesive. · Do not put the sticky part of a bandage directly on the adhesive. · Do not put any kind of ointment, cream, or lotion over the area. This can make the adhesive fall off too soon. Do not use hydrogen peroxide or alcohol, which can slow healing. When should you call for help? Call your doctor now or seek immediate medical care if:    · You have new pain, or your pain gets worse.     · The skin near the cut is cold or pale or changes color.     · You have tingling, weakness, or numbness near the cut.     · The cut starts to bleed, and blood soaks through the bandage. Oozing small amounts of blood is normal.     · You have trouble moving the area near the cut.     · You have symptoms of infection, such as:  ? Increased pain, swelling, warmth, or redness around the cut.  ?  Red streaks leading from the cut.  ? Pus draining from the cut.  ? A fever. Watch closely for changes in your health, and be sure to contact your doctor if:    · The cut reopens.     · You do not get better as expected. Where can you learn more? Go to https://chstiveneb.healthExco inTouch. org and sign in to your [employer] account. Enter M735 in the [place] box to learn more about \"Cuts: Care Instructions. \"     If you do not have an account, please click on the \"Sign Up Now\" link. Current as of: July 1, 2021               Content Version: 13.0  © 2361-7844 Healthwise, Incorporated. Care instructions adapted under license by [facility] ([facility]). If you have questions about a medical condition or this instruction, always ask your healthcare professional. [address] any warranty or liability for your use of this information.

## 2021-12-09 NOTE — PROGRESS NOTES
Subjective:      Edin Valladares is a 23 y.o. who obtained a laceration 10 days ago, which required closure with 4 suture (s). he denies pain, redness, or drainage from the wound. Objective: There were no vitals taken for this visit. Injury exam:  A 2 cm laceration noted on the left index is healing well, without evidence of infection. Assessment:      Laceration is healing well, without evidence of infection. Plan:      1. 4 suture (s) were removed. 2. Wound care discussed. 3. Follow-up as needed.